# Patient Record
Sex: MALE | Race: WHITE | NOT HISPANIC OR LATINO | Employment: OTHER | URBAN - METROPOLITAN AREA
[De-identification: names, ages, dates, MRNs, and addresses within clinical notes are randomized per-mention and may not be internally consistent; named-entity substitution may affect disease eponyms.]

---

## 2018-04-16 RX ORDER — HYOSCYAMINE SULFATE 0.12 MG/1
TABLET SUBLINGUAL
COMMUNITY
Start: 2011-06-30

## 2018-04-16 RX ORDER — HYDROCHLOROTHIAZIDE 25 MG/1
25 TABLET ORAL DAILY
COMMUNITY
Start: 2011-06-30

## 2018-04-16 RX ORDER — RABEPRAZOLE SODIUM 20 MG/1
20 TABLET, DELAYED RELEASE ORAL DAILY
COMMUNITY
Start: 2011-06-30

## 2018-04-16 RX ORDER — GLUCOSAM/CHONDRO/HERB 149/HYAL 750-100 MG
TABLET ORAL
COMMUNITY
Start: 2011-06-30 | End: 2021-07-29

## 2018-04-16 RX ORDER — LORATADINE 10 MG/1
TABLET ORAL
COMMUNITY
Start: 2011-06-30 | End: 2024-01-04 | Stop reason: ALTCHOICE

## 2018-04-16 RX ORDER — SUMATRIPTAN SUCCINATE 50 MG/1
TABLET ORAL
COMMUNITY
Start: 2017-10-27 | End: 2023-10-09

## 2018-04-16 RX ORDER — METRONIDAZOLE 7.5 MG/G
CREAM TOPICAL
COMMUNITY
Start: 2011-06-30 | End: 2021-04-20

## 2018-04-16 RX ORDER — LOSARTAN POTASSIUM 100 MG/1
100 TABLET ORAL DAILY
COMMUNITY

## 2018-04-16 RX ORDER — BUTALBITAL, ACETAMINOPHEN AND CAFFEINE 50; 325; 40 MG/1; MG/1; MG/1
TABLET ORAL
COMMUNITY
Start: 2011-06-30 | End: 2021-07-29

## 2018-04-16 RX ORDER — BETAMETHASONE DIPROPIONATE 0.5 MG/G
LOTION TOPICAL
COMMUNITY
Start: 2011-06-30 | End: 2021-04-20 | Stop reason: SDUPTHER

## 2018-04-16 RX ORDER — LANOLIN ALCOHOL/MO/W.PET/CERES
CREAM (GRAM) TOPICAL
COMMUNITY
Start: 2011-06-30

## 2018-04-16 RX ORDER — BETAMETHASONE DIPROPIONATE 0.5 MG/G
OINTMENT TOPICAL
COMMUNITY
Start: 2011-06-30 | End: 2021-04-20 | Stop reason: SDUPTHER

## 2018-04-16 RX ORDER — TAMSULOSIN HYDROCHLORIDE 0.4 MG/1
CAPSULE ORAL
COMMUNITY
Start: 2011-06-30

## 2018-04-16 RX ORDER — PRAVASTATIN SODIUM 40 MG/1
TABLET ORAL
COMMUNITY
Start: 2011-06-30

## 2018-04-16 RX ORDER — VERAPAMIL HYDROCHLORIDE 240 MG/1
CAPSULE, DELAYED RELEASE ORAL
COMMUNITY
Start: 2011-06-30 | End: 2021-08-06 | Stop reason: SDUPTHER

## 2018-04-16 RX ORDER — CLOBETASOL PROPIONATE 0.5 MG/G
AEROSOL, FOAM TOPICAL
COMMUNITY
Start: 2011-06-30

## 2018-04-16 RX ORDER — BUPROPION HYDROCHLORIDE 100 MG/1
TABLET, EXTENDED RELEASE ORAL
COMMUNITY
Start: 2011-06-30 | End: 2023-10-09

## 2018-04-16 RX ORDER — ACETAMINOPHEN AND CODEINE PHOSPHATE 300; 30 MG/1; MG/1
TABLET ORAL
COMMUNITY
Start: 2011-06-30

## 2018-04-16 RX ORDER — CLORAZEPATE DIPOTASSIUM 3.75 MG/1
TABLET ORAL
COMMUNITY
Start: 2011-06-30 | End: 2021-04-20

## 2018-04-16 RX ORDER — SUMATRIPTAN SUCCINATE 6 MG/.5ML
INJECTION, SOLUTION SUBCUTANEOUS
COMMUNITY
Start: 2018-01-19 | End: 2021-04-20

## 2018-04-16 RX ORDER — PROMETHAZINE HYDROCHLORIDE 25 MG/1
TABLET ORAL
COMMUNITY
Start: 2017-10-27 | End: 2019-06-06 | Stop reason: SDUPTHER

## 2018-04-16 RX ORDER — FLUOCINONIDE GEL 0.5 MG/G
GEL TOPICAL
COMMUNITY
Start: 2011-06-30 | End: 2021-04-20

## 2018-04-16 RX ORDER — BENZOCAINE .13; .15; .5; 2 G/100G; G/100G; G/100G; G/100G
GEL ORAL
COMMUNITY
Start: 2011-06-30

## 2018-04-16 RX ORDER — AZELASTINE 1 MG/ML
SPRAY, METERED NASAL
COMMUNITY
Start: 2011-06-30

## 2018-04-16 RX ORDER — CALCIPOTRIENE 50 UG/G
CREAM TOPICAL
COMMUNITY
Start: 2011-06-30 | End: 2021-04-20

## 2018-04-16 RX ORDER — CITALOPRAM 40 MG/1
TABLET, FILM COATED ORAL
COMMUNITY
Start: 2011-06-30 | End: 2023-10-09

## 2018-04-20 ENCOUNTER — PROCEDURE VISIT (OUTPATIENT)
Dept: NEUROLOGY | Facility: CLINIC | Age: 58
End: 2018-04-20
Attending: PSYCHIATRY & NEUROLOGY
Payer: COMMERCIAL

## 2018-04-20 DIAGNOSIS — G43.719 INTRACTABLE CHRONIC MIGRAINE WITHOUT AURA AND WITHOUT STATUS MIGRAINOSUS: Primary | ICD-10-CM

## 2018-04-20 PROCEDURE — 64615 CHEMODENERV MUSC MIGRAINE: CPT | Performed by: PSYCHIATRY & NEUROLOGY

## 2018-04-20 NOTE — PROCEDURES
Procedures  This Olu A Collins was injected with Botox using the migraine paradigm.  The patient tolerated the injections without difficulty.    The patient received 20 u of Botox in Right and Left frontalis, two sites each.  The patient received 40 units of Botox in the right and left temporalis in 4 sites each.  Patient  received 30 units of Botox in the right and left occipitalis in  3 sites each.  Patient  received 20 units of Botox in the right and left paraspinal in 2 sites each.  The patient received 30 units of Botox in the right and left trapezius in 3 sites each.  The total amount of Botox received was 155 units.    4/20/2018    Mauricio Love MD

## 2018-08-03 ENCOUNTER — TELEPHONE (OUTPATIENT)
Dept: NEUROLOGY | Facility: CLINIC | Age: 58
End: 2018-08-03

## 2018-08-03 ENCOUNTER — PROCEDURE VISIT (OUTPATIENT)
Dept: NEUROLOGY | Facility: CLINIC | Age: 58
End: 2018-08-03
Payer: COMMERCIAL

## 2018-08-03 DIAGNOSIS — G43.719 INTRACTABLE CHRONIC MIGRAINE WITHOUT AURA AND WITHOUT STATUS MIGRAINOSUS: Primary | ICD-10-CM

## 2018-08-03 PROCEDURE — 99999 PR OFFICE/OUTPT VISIT,PROCEDURE ONLY: CPT | Performed by: PSYCHIATRY & NEUROLOGY

## 2018-08-03 PROCEDURE — 64615 CHEMODENERV MUSC MIGRAINE: CPT | Performed by: PSYCHIATRY & NEUROLOGY

## 2018-08-03 RX ORDER — CALCIUM CARBONATE 260MG(650)
TABLET,CHEWABLE ORAL
COMMUNITY
End: 2023-05-24 | Stop reason: ALTCHOICE

## 2018-08-03 RX ORDER — POTASSIUM CHLORIDE 20 MEQ/1
20 TABLET, EXTENDED RELEASE ORAL DAILY
COMMUNITY

## 2018-08-03 NOTE — PROCEDURES
Procedures  This Olu A Collins was injected with Botox using the migraine paradigm.  The patient tolerated the injections without difficulty.    The patient received 20 u of Botox in Right and Left frontalis, two sites each.  The patient received 40 units of Botox in the right and left temporalis in 4 sites each.  Patient  received 30 units of Botox in the right and left occipitalis in  3 sites each.  Patient  received 20 units of Botox in the right and left paraspinal in 2 sites each.  The patient received 30 units of Botox in the right and left trapezius in 3 sites each.  The total amount of Botox received was 155 units.    8/3/2018    Mauricio Love MD

## 2018-10-26 ENCOUNTER — PROCEDURE VISIT (OUTPATIENT)
Dept: NEUROLOGY | Facility: CLINIC | Age: 58
End: 2018-10-26
Payer: COMMERCIAL

## 2018-10-26 DIAGNOSIS — G43.719 CHRONIC MIGRAINE WITHOUT AURA, INTRACTABLE, WITHOUT STATUS MIGRAINOSUS: Primary | ICD-10-CM

## 2018-10-26 PROCEDURE — 99999 PR OFFICE/OUTPT VISIT,PROCEDURE ONLY: CPT | Performed by: PSYCHIATRY & NEUROLOGY

## 2018-10-26 PROCEDURE — 64615 CHEMODENERV MUSC MIGRAINE: CPT | Performed by: PSYCHIATRY & NEUROLOGY

## 2019-01-18 ENCOUNTER — PROCEDURE VISIT (OUTPATIENT)
Dept: NEUROLOGY | Facility: CLINIC | Age: 59
End: 2019-01-18
Payer: COMMERCIAL

## 2019-01-18 DIAGNOSIS — G43.719 INTRACTABLE CHRONIC MIGRAINE WITHOUT AURA AND WITHOUT STATUS MIGRAINOSUS: ICD-10-CM

## 2019-01-18 DIAGNOSIS — G43.719 CHRONIC MIGRAINE WITHOUT AURA, INTRACTABLE, WITHOUT STATUS MIGRAINOSUS: Primary | ICD-10-CM

## 2019-01-18 PROCEDURE — 64615 CHEMODENERV MUSC MIGRAINE: CPT | Performed by: PSYCHIATRY & NEUROLOGY

## 2019-01-18 PROCEDURE — 99999 PR OFFICE/OUTPT VISIT,PROCEDURE ONLY: CPT | Performed by: PSYCHIATRY & NEUROLOGY

## 2019-01-18 NOTE — PROCEDURES
Procedures   This Olu A Collins was injected with Botox using the migraine paradigm.  The patient tolerated the injections without difficulty.    .The patient received 20 u of Botox in Right and Left frontalis, two sites each.  The patient received 40 units of Botox in the right and left temporalis in 4 sites each.  Patient  received 30 units of Botox in the right and left occipitalis in  3 sites each.  Patient  received 20 units of Botox in the right and left paraspinal in 2 sites each.  The patient received 30 units of Botox in the right and left trapezius in 3 sites each.  The total amount of Botox received was 140 units.    1/18/2019    Mauricio Love MD

## 2019-04-12 ENCOUNTER — PROCEDURE VISIT (OUTPATIENT)
Dept: NEUROLOGY | Facility: CLINIC | Age: 59
End: 2019-04-12
Payer: COMMERCIAL

## 2019-04-12 DIAGNOSIS — G43.719 INTRACTABLE CHRONIC MIGRAINE WITHOUT AURA AND WITHOUT STATUS MIGRAINOSUS: Primary | ICD-10-CM

## 2019-04-12 PROCEDURE — 64615 CHEMODENERV MUSC MIGRAINE: CPT | Performed by: PSYCHIATRY & NEUROLOGY

## 2019-04-12 PROCEDURE — 99999 PR OFFICE/OUTPT VISIT,PROCEDURE ONLY: CPT | Performed by: PSYCHIATRY & NEUROLOGY

## 2019-04-12 NOTE — PROCEDURES
Procedures  This Olu A Collins was injected with Botox using the migraine paradigm.  The patient tolerated the injections without difficulty.    The patient received 20 u of Botox in Right and Left frontalis, two sites each.  The patient received 40 units of Botox in the right and left temporalis in 4 sites each.  Patient  received 30 units of Botox in the right and left occipitalis in  3 sites each.  Patient  received 20 units of Botox in the right and left paraspinal in 2 sites each.  The patient received 30 units of Botox in the right and left trapezius in 3 sites each.  The total amount of Botox received was 1490 units.    4/12/2019    Mauricio Love MD  
No

## 2019-06-06 ENCOUNTER — OFFICE VISIT (OUTPATIENT)
Dept: NEUROLOGY | Facility: CLINIC | Age: 59
End: 2019-06-06
Payer: COMMERCIAL

## 2019-06-06 VITALS
SYSTOLIC BLOOD PRESSURE: 114 MMHG | HEART RATE: 65 BPM | RESPIRATION RATE: 16 BRPM | OXYGEN SATURATION: 95 % | DIASTOLIC BLOOD PRESSURE: 74 MMHG

## 2019-06-06 DIAGNOSIS — G43.719 CHRONIC MIGRAINE WITHOUT AURA, INTRACTABLE, WITHOUT STATUS MIGRAINOSUS: ICD-10-CM

## 2019-06-06 DIAGNOSIS — G43.719 INTRACTABLE CHRONIC MIGRAINE WITHOUT AURA AND WITHOUT STATUS MIGRAINOSUS: Primary | ICD-10-CM

## 2019-06-06 PROCEDURE — 99214 OFFICE O/P EST MOD 30 MIN: CPT | Performed by: PSYCHIATRY & NEUROLOGY

## 2019-06-06 RX ORDER — CYCLOBENZAPRINE HCL 10 MG
1 TABLET ORAL 3 TIMES DAILY PRN
Refills: 0 | COMMUNITY
Start: 2019-04-30 | End: 2023-04-17

## 2019-06-06 RX ORDER — PROMETHAZINE HYDROCHLORIDE 25 MG/1
TABLET ORAL
Qty: 12 TABLET | Refills: 0 | Status: SHIPPED | OUTPATIENT
Start: 2019-06-06 | End: 2023-05-24 | Stop reason: ALTCHOICE

## 2019-06-06 RX ORDER — ESCITALOPRAM OXALATE 10 MG/1
10 TABLET ORAL DAILY
COMMUNITY
Start: 2019-06-04

## 2019-06-06 NOTE — PROGRESS NOTES
6/6/2019    Subjective      Patient ID: Olu Collins is a 59 y.o. male.    HPI     **The patient was back in the office today.  He has had some substantial benefit with the Botox.  His headaches are less intense but no less frequent.  He gets about 2-3 headaches per week.  He is interested in supplementing his Botox with another preventive.  Today we talked at length about the various CGRP antagonist.  He does have gotten issues and I have concerns about him getting severe constipation from the Aimovig.  He would also like a self injector.  I think it is most reasonable to start with the Emgality as I can give him samples and teach him how to use it here.  I have advised him of the side effects.  I have instructed him on expectations and the need to inject it for at least 3 times unless he is having an allergic or other untoward reaction.  He is comfortable with this.*    Current Outpatient Prescriptions   Medication Sig Dispense Refill   • acetaminophen-codeine (TYLENOL-CODEINE #3) 300-30 mg per tablet take 1 tablet by oral route  every 6 hours as needed     • amoxicillin (AMOXIL) 500 mg capsule Take 500 mg by mouth 3 (three) times a day.  0   • azelastine (ASTELIN) 137 mcg (0.1 %) nasal spray spray 2 spray by intranasal route 2 times every day in each nostril     • betamethasone dipropionate (DIPROLENE) 0.05 % ointment apply by topical route  every day a thin layer to the affected area(s)     • betamethasone dipropionate (DIPROSONE) 0.05 % lotion apply by topical route  every day a few drops to the affected area(s) and massage lightly until it disappears     • budesonide (RHINOCORT AQUA) 32 mcg/actuation nasal spray spray 1 spray by intranasal route  every day in each nostril     • buPROPion SR (WELLBUTRIN SR) 100 mg 12 hr tablet take 1 tablet (100MG)  by oral route  every day     • butalbital-acetaminophen-caff (FIORICET, ESGIC) -40 mg per tablet take 1 tablet by oral route  every 4 hours as needed not  to exceed 6 tablets per 24hrs     • calcipotriene (DOVONEX) 0.005 % cream apply by topical route 2 times every day a sufficient amount to cover the lesions in the affected area(s) ; rub in gently and completely     • citalopram (CELEXA) 40 mg tablet take 1 tablet (40MG)  by oral route  every day     • clobetasol (OLUX) 0.05 % topical foam apply by topical route 2 times every day to the affected area(s) in the morning and evening     • clorazepate (TRANXENE T-TAB) 3.75 mg tablet take 1 tablet (3.75MG)  by oral route prn     • cyanocobalamin (vitamin B-12) 1,000 mcg tablet Daily     • cyclobenzaprine (FLEXERIL) 10 mg tablet Take 1 tablet by mouth 3 (three) times a day as needed for muscle spasms.  0   • escitalopram (LEXAPRO) 10 mg tablet      • fluocinonide (LIDEX) 0.05 % gel apply by topical route 2 times every day to the affected area(s)     • hydrochlorothiazide (HYDRODIURIL) 25 mg tablet 25 mg. take 1 tablet (50MG)  by oral route  every day      • HYDROcodone-acetaminophen (NORCO) 5-325 mg per tablet TAKE 1 TABLET BY MOUTH EVERY 4-6 HRS AS NEEDED FOR PAIN  0   • hyoscyamine (LEVSIN) 0.125 mg SL tablet prn     • ibuprofen (MOTRIN) 600 mg tablet TAKE 1 TABLET BY MOUTH EVERY 6 HRS AS NEEDED  0   • lodoxamide (ALOMIDE) 0.1 % ophthalmic solution instill 1 drop by ophthalmic route 4 times every day into affected eye(s)     • loratadine (CLARITIN) 10 mg tablet take 1 tablet (10MG)  prn     • losartan (COZAAR) 50 mg tablet 50 mg. take 1 tablet by oral route  every day      • magnesium citrate 100 mg tablet Take by mouth.     • metroNIDAZOLE (METROCREAM) 0.75 % cream apply by topical route 2 times every day a thin layer to the affected area(s) in the morning and evening     • omega 3-dha-epa-fish oil (FISH OIL) 1,000 mg (120 mg-180 mg) capsule take 1 Tablet by Oral route  every day     • potassium chloride (KLOR-CON) 20 mEq CR tablet Take 20 mEq by mouth 2 (two) times a day.     • pravastatin (PRAVACHOL) 40 mg tablet take  1 tablet (40MG)  by oral route  every day     • promethazine (PHENERGAN) 25 mg tablet TAKE ONE BY MOUTH PRN EVERY 4-6 HOURS. UP TO TWO PER DAY NO MORE THAN TWO DAYS PER WEEK 12 tablet 0   • RABEprazole (ACIPHEX) 20 mg EC tablet take 40 mg  by oral route  every day     • ranitidine (ZANTAC) 150 mg capsule take 1 capsule (150MG)  by oral route prn     • SUMAtriptan (IMITREX) 50 mg tablet prn     • SUMAtriptan succinate (IMITREX STATDOSE KIT REFILL) 6 mg/0.5 mL kit inject (6MG)  by subcutaneous route once; may be repeated in 2hrs prn, up to 2/d , 2d/wk     • tamsulosin (FLOMAX) 0.4 mg capsule take 1 capsule (0.4MG)  by oral route  BID     • verapamil (VERELAN) 240 mg 24 hr capsule take 1 capsule (240MG)  by oral route  every day     • galcanezumab-gnlm (EMGALITY PEN) 120 mg/mL pen injector subcutaneous pen Inject 1 mL (120 mg total) under the skin once for 1 dose. One injection q30 days 1 Syringe 3     No current facility-administered medications for this visit.        The following have been reviewed and updated as appropriate in this visit:       Review of Systems  Neg  Objective     Physical Exam    On examination the patient is well-developed and well-nourished.The patient is alert and oriented to time place and person, the patient has intact recent and remote memory, with appropriate attention span and concentration.  The patient is appropriate with language,can name objects, can repeat phrases, and has has intact spontaneous speech.  The patient is aware of current events, past history, and he has an intact vocabulary.  There is normal station and gait.  There is normal strength and muscle tone of all major muscle groups of the upper and lower extremities.  There is no fasciculations or atrophy.  Cardiac evaluation is normal, with regular rate and rhythm.  There are no murmurs or gallops.      Assessment/Plan   Problem List Items Addressed This Visit     Migraines - Primary    Relevant Medications     galcanezumab-gnlm (EMGALITY PEN) 120 mg/mL pen injector subcutaneous pen    Chronic migraine without aura, intractable, without status migrainosus    Relevant Medications    galcanezumab-gnlm (EMGALITY PEN) 120 mg/mL pen injector subcutaneous pen          **I will give him a sample of the Emgality and order a prescription through the pharmacy.  We will also give him a co-pay card he knows that he needs to inject 2 doses today as a starting dose with the Emgality and then 1 injection/month for a total of 3 separate injections to be given at 30-day intervals.*

## 2019-07-05 ENCOUNTER — PROCEDURE VISIT (OUTPATIENT)
Dept: NEUROLOGY | Facility: CLINIC | Age: 59
End: 2019-07-05
Payer: COMMERCIAL

## 2019-07-05 DIAGNOSIS — G43.719 CHRONIC MIGRAINE WITHOUT AURA, INTRACTABLE, WITHOUT STATUS MIGRAINOSUS: Primary | ICD-10-CM

## 2019-07-05 PROCEDURE — 64615 CHEMODENERV MUSC MIGRAINE: CPT | Performed by: PSYCHIATRY & NEUROLOGY

## 2019-07-05 PROCEDURE — 99999 PR OFFICE/OUTPT VISIT,PROCEDURE ONLY: CPT | Performed by: PSYCHIATRY & NEUROLOGY

## 2019-07-05 NOTE — PROCEDURES
Procedures   This Olu A Collins was injected with Botox using the migraine paradigm.  The patient tolerated the injections without difficulty.    The patient received 20 u of Botox in Right and Left frontalis, two sites each.  The patient received 40 units of Botox in the right and left temporalis in 4 sites each.  Patient  received 30 units of Botox in the right and left occipitalis in  3 sites each.  Patient  received 20 units of Botox in the right and left paraspinal in 2 sites each.  The patient received 30 units of Botox in the right and left trapezius in 3 sites each.  The total amount of Botox received was 140 units.    7/5/2019    Mauricio Love MD

## 2019-08-08 ENCOUNTER — OFFICE VISIT (OUTPATIENT)
Dept: NEUROLOGY | Facility: CLINIC | Age: 59
End: 2019-08-08
Payer: COMMERCIAL

## 2019-08-08 VITALS
HEART RATE: 88 BPM | SYSTOLIC BLOOD PRESSURE: 140 MMHG | OXYGEN SATURATION: 98 % | DIASTOLIC BLOOD PRESSURE: 82 MMHG | RESPIRATION RATE: 14 BRPM

## 2019-08-08 DIAGNOSIS — G43.719 CHRONIC MIGRAINE WITHOUT AURA, INTRACTABLE, WITHOUT STATUS MIGRAINOSUS: Primary | ICD-10-CM

## 2019-08-08 DIAGNOSIS — G43.719 INTRACTABLE CHRONIC MIGRAINE WITHOUT AURA AND WITHOUT STATUS MIGRAINOSUS: ICD-10-CM

## 2019-08-08 PROCEDURE — 99213 OFFICE O/P EST LOW 20 MIN: CPT | Performed by: PSYCHIATRY & NEUROLOGY

## 2019-08-08 NOTE — PROGRESS NOTES
8/8/2019    Subjective      Patient ID: Olu Collins is a 59 y.o. male.    HPI     The patient is back in the office.  I last saw him for his injection on about 1 month ago.  He is back because I had started him on Emgality.  He just had his fourth injection a few days ago.  He has noticed substantial improvement in the frequency of his headaches.  He has gone from about 14 headache days per month to approximately 6.  He does have an injection site reaction in the form of itching.  At one point he thought his SI joint may have been more inflamed.  He is not certain about this.  He is leaving for [place] and will be away for 6 weeks.  We will arrange for him to get the Emgality during this time.  He also asked me about CBD oil.  I told him that there was no good objective evidence 1 where the other.  It is sold freely in the stores and if he wanted to try it at my had no problem with that.    Current Outpatient Prescriptions   Medication Sig Dispense Refill   • acetaminophen-codeine (TYLENOL-CODEINE #3) 300-30 mg per tablet take 1 tablet by oral route  every 6 hours as needed     • azelastine (ASTELIN) 137 mcg (0.1 %) nasal spray spray 2 spray by intranasal route 2 times every day in each nostril     • betamethasone dipropionate (DIPROLENE) 0.05 % ointment apply by topical route  every day a thin layer to the affected area(s)     • betamethasone dipropionate (DIPROSONE) 0.05 % lotion apply by topical route  every day a few drops to the affected area(s) and massage lightly until it disappears     • budesonide (RHINOCORT AQUA) 32 mcg/actuation nasal spray spray 1 spray by intranasal route  every day in each nostril     • calcipotriene (DOVONEX) 0.005 % cream apply by topical route 2 times every day a sufficient amount to cover the lesions in the affected area(s) ; rub in gently and completely     • clobetasol (OLUX) 0.05 % topical foam apply by topical route 2 times every day to the affected area(s) in the morning and  evening     • clorazepate (TRANXENE T-TAB) 3.75 mg tablet take 1 tablet (3.75MG)  by oral route prn     • cyanocobalamin (vitamin B-12) 1,000 mcg tablet Daily     • cyclobenzaprine (FLEXERIL) 10 mg tablet Take 1 tablet by mouth 3 (three) times a day as needed for muscle spasms.  0   • escitalopram (LEXAPRO) 10 mg tablet      • fluocinonide (LIDEX) 0.05 % gel apply by topical route 2 times every day to the affected area(s)     • galcanezumab-gnlm (EMGALITY PEN) 120 mg/mL pen injector subcutaneous pen Inject 1 mL (120 mg total) under the skin once for 1 dose. One injection q30 days 1 Syringe 3   • hydrochlorothiazide (HYDRODIURIL) 25 mg tablet 25 mg. take 1 tablet (50MG)  by oral route  every day      • hyoscyamine (LEVSIN) 0.125 mg SL tablet prn     • ibuprofen (MOTRIN) 600 mg tablet TAKE 1 TABLET BY MOUTH EVERY 6 HRS AS NEEDED  0   • lodoxamide (ALOMIDE) 0.1 % ophthalmic solution instill 1 drop by ophthalmic route 4 times every day into affected eye(s)     • loratadine (CLARITIN) 10 mg tablet take 1 tablet (10MG)  prn     • losartan (COZAAR) 100 mg tablet Take 100 mg by mouth daily. take 1 tablet by oral route  every day      • magnesium citrate 100 mg tablet Take by mouth.     • omega 3-dha-epa-fish oil (FISH OIL) 1,000 mg (120 mg-180 mg) capsule take 1 Tablet by Oral route  every day     • potassium chloride (KLOR-CON) 20 mEq CR tablet Take 20 mEq by mouth 2 (two) times a day.     • pravastatin (PRAVACHOL) 40 mg tablet take 1 tablet (40MG)  by oral route  every day     • promethazine (PHENERGAN) 25 mg tablet TAKE ONE BY MOUTH PRN EVERY 4-6 HOURS. UP TO TWO PER DAY NO MORE THAN TWO DAYS PER WEEK 12 tablet 0   • RABEprazole (ACIPHEX) 20 mg EC tablet take 40 mg  by oral route  every day     • ranitidine (ZANTAC) 150 mg capsule take 1 capsule (150MG)  by oral route prn     • SUMAtriptan (IMITREX) 50 mg tablet prn     • SUMAtriptan succinate (IMITREX STATDOSE KIT REFILL) 6 mg/0.5 mL kit inject (6MG)  by subcutaneous  route once; may be repeated in 2hrs prn, up to 2/d , 2d/wk     • tamsulosin (FLOMAX) 0.4 mg capsule take 1 capsule (0.4MG)  by oral route  BID     • verapamil (VERELAN) 240 mg 24 hr capsule take 1 capsule (240MG)  by oral route  every day     • amoxicillin (AMOXIL) 500 mg capsule Take 500 mg by mouth 3 (three) times a day.  0   • buPROPion SR (WELLBUTRIN SR) 100 mg 12 hr tablet take 1 tablet (100MG)  by oral route  every day     • butalbital-acetaminophen-caff (FIORICET, ESGIC) -40 mg per tablet take 1 tablet by oral route  every 4 hours as needed not to exceed 6 tablets per 24hrs     • citalopram (CELEXA) 40 mg tablet take 1 tablet (40MG)  by oral route  every day     • HYDROcodone-acetaminophen (NORCO) 5-325 mg per tablet TAKE 1 TABLET BY MOUTH EVERY 4-6 HRS AS NEEDED FOR PAIN  0   • metroNIDAZOLE (METROCREAM) 0.75 % cream apply by topical route 2 times every day a thin layer to the affected area(s) in the morning and evening       No current facility-administered medications for this visit.        The following have been reviewed and updated as appropriate in this visit:  Problems       Review of Systems  Neg  Objective     Physical Exam    On examination the patient is well-developed and well-nourished.The patient is alert and oriented to time place and person, the patient has intact recent and remote memory, with appropriate attention span and concentration.  The patient is appropriate with language,can name objects, can repeat phrases, and has has intact spontaneous speech.  The patient is aware of current events, past history, and he has an intact vocabulary.  There is normal station and gait.  There is normal strength and muscle tone of all major muscle groups of the upper and lower extremities.  There is no fasciculations or atrophy.  Cardiac evaluation is normal, with regular rate and rhythm.  There are no murmurs or gallops.      Assessment/Plan   Problem List Items Addressed This Visit     Chronic  migraine without aura, intractable, without status migrainosus - Primary      Other Visit Diagnoses     Intractable chronic migraine without aura and without status migrainosus              For now we will continue with the Emgality and his Botox injections as planned.  He is to call me if there is any problems or questions in the meantime.

## 2019-09-25 ENCOUNTER — TELEPHONE (OUTPATIENT)
Dept: PRIMARY CARE | Facility: CLINIC | Age: 59
End: 2019-09-25

## 2019-09-27 NOTE — TELEPHONE ENCOUNTER
I called the patient to discuss whether he in fact wanted to stop the Botox.  I told him to call the office if he wanted to discuss it with me.  I did tell him that if he does stop the Botox he would need to see me periodically in the office to follow-up on his headaches secure as Emgality etc.  Told him to call me in the office if he has any questions.

## 2019-10-02 NOTE — TELEPHONE ENCOUNTER
Patient called back he is fine with coming in to be seen more often should he keep his appointment on Friday for a follow up or can he cancel and schedule for another day.    [phone]

## 2020-01-06 ENCOUNTER — TELEPHONE (OUTPATIENT)
Dept: PRIMARY CARE | Facility: CLINIC | Age: 60
End: 2020-01-06

## 2020-02-19 ENCOUNTER — OFFICE VISIT (OUTPATIENT)
Dept: NEUROLOGY | Facility: CLINIC | Age: 60
End: 2020-02-19
Payer: COMMERCIAL

## 2020-02-19 VITALS
DIASTOLIC BLOOD PRESSURE: 70 MMHG | HEART RATE: 68 BPM | SYSTOLIC BLOOD PRESSURE: 112 MMHG | RESPIRATION RATE: 14 BRPM | OXYGEN SATURATION: 99 %

## 2020-02-19 DIAGNOSIS — G43.719 CHRONIC MIGRAINE WITHOUT AURA, INTRACTABLE, WITHOUT STATUS MIGRAINOSUS: Primary | ICD-10-CM

## 2020-02-19 DIAGNOSIS — G43.719 INTRACTABLE CHRONIC MIGRAINE WITHOUT AURA AND WITHOUT STATUS MIGRAINOSUS: ICD-10-CM

## 2020-02-19 PROBLEM — N30.10 CHRONIC INTERSTITIAL CYSTITIS: Status: ACTIVE | Noted: 2019-12-02

## 2020-02-19 PROCEDURE — 99212 OFFICE O/P EST SF 10 MIN: CPT | Performed by: PSYCHIATRY & NEUROLOGY

## 2020-02-19 RX ORDER — FINASTERIDE 5 MG/1
5 TABLET, FILM COATED ORAL DAILY
COMMUNITY

## 2020-02-19 RX ORDER — TIZANIDINE 2 MG/1
2 TABLET ORAL EVERY 6 HOURS PRN
COMMUNITY
End: 2021-07-29

## 2020-02-19 NOTE — PROGRESS NOTES
2/19/2020    Subjective      Patient ID: Olu Collins is a 59 y.o. male.    HPI     **.  He gets about 2 inches of redness and itching around that site.  It is not spread he is not getting itching elsewhere.  He feels it when he gets his CGRP his bladder pain becomes worse.  He has talked to his urologist who thinks that this may be the case.  I am is not incontinent or losing his urine.  He is getting about 1 headache per month.  He is getting some headaches related to neck positioning.  He does do his exercises fairly regularly.  Overall he is doing quite well.    Current Outpatient Medications   Medication Sig Dispense Refill   • acetaminophen-codeine (TYLENOL-CODEINE #3) 300-30 mg per tablet take 1 tablet by oral route  every 6 hours as needed     • azelastine (ASTELIN) 137 mcg (0.1 %) nasal spray spray 2 spray by intranasal route 2 times every day in each nostril     • betamethasone dipropionate (DIPROLENE) 0.05 % ointment apply by topical route  every day a thin layer to the affected area(s)     • betamethasone dipropionate (DIPROSONE) 0.05 % lotion apply by topical route  every day a few drops to the affected area(s) and massage lightly until it disappears     • budesonide (RHINOCORT AQUA) 32 mcg/actuation nasal spray spray 1 spray by intranasal route  every day in each nostril     • buPROPion SR (WELLBUTRIN SR) 100 mg 12 hr tablet take 1 tablet (100MG)  by oral route  every day     • butalbital-acetaminophen-caff (FIORICET, ESGIC) -40 mg per tablet take 1 tablet by oral route  every 4 hours as needed not to exceed 6 tablets per 24hrs     • calcipotriene (DOVONEX) 0.005 % cream apply by topical route 2 times every day a sufficient amount to cover the lesions in the affected area(s) ; rub in gently and completely     • citalopram (CELEXA) 40 mg tablet take 1 tablet (40MG)  by oral route  every day     • clobetasol (OLUX) 0.05 % topical foam apply by topical route 2 times every day to the affected  area(s) in the morning and evening     • clorazepate (TRANXENE T-TAB) 3.75 mg tablet take 1 tablet (3.75MG)  by oral route prn     • cyanocobalamin (vitamin B-12) 1,000 mcg tablet Daily     • cyclobenzaprine (FLEXERIL) 10 mg tablet Take 1 tablet by mouth 3 (three) times a day as needed for muscle spasms.  0   • escitalopram (LEXAPRO) 10 mg tablet      • finasteride (PROSCAR) 5 mg tablet Take 5 mg by mouth daily.     • fluocinonide (LIDEX) 0.05 % gel apply by topical route 2 times every day to the affected area(s)     • galcanezumab-gnlm (EMGALITY SYRINGE) 120 mg/mL syringe Inject 120 mg under the skin every 28 (twentyeight) days.     • hydrochlorothiazide (HYDRODIURIL) 25 mg tablet 25 mg. take 1 tablet (50MG)  by oral route  every day      • HYDROcodone-acetaminophen (NORCO) 5-325 mg per tablet TAKE 1 TABLET BY MOUTH EVERY 4-6 HRS AS NEEDED FOR PAIN  0   • hyoscyamine (LEVSIN) 0.125 mg SL tablet prn     • ibuprofen (MOTRIN) 600 mg tablet TAKE 1 TABLET BY MOUTH EVERY 6 HRS AS NEEDED  0   • lodoxamide (ALOMIDE) 0.1 % ophthalmic solution instill 1 drop by ophthalmic route 4 times every day into affected eye(s)     • loratadine (CLARITIN) 10 mg tablet take 1 tablet (10MG)  prn     • losartan (COZAAR) 100 mg tablet Take 100 mg by mouth daily. take 1 tablet by oral route  every day      • magnesium citrate 100 mg tablet Take by mouth.     • metroNIDAZOLE (METROCREAM) 0.75 % cream apply by topical route 2 times every day a thin layer to the affected area(s) in the morning and evening     • omega 3-dha-epa-fish oil (FISH OIL) 1,000 mg (120 mg-180 mg) capsule take 1 Tablet by Oral route  every day     • potassium chloride (KLOR-CON) 20 mEq CR tablet Take 20 mEq by mouth 2 (two) times a day.     • pravastatin (PRAVACHOL) 40 mg tablet take 1 tablet (40MG)  by oral route  every day     • promethazine (PHENERGAN) 25 mg tablet TAKE ONE BY MOUTH PRN EVERY 4-6 HOURS. UP TO TWO PER DAY NO MORE THAN TWO DAYS PER WEEK 12 tablet 0   •  RABEprazole (ACIPHEX) 20 mg EC tablet take 40 mg  by oral route  every day     • ranitidine (ZANTAC) 150 mg capsule take 1 capsule (150MG)  by oral route prn     • SUMAtriptan (IMITREX) 50 mg tablet prn     • SUMAtriptan succinate (IMITREX STATDOSE KIT REFILL) 6 mg/0.5 mL kit inject (6MG)  by subcutaneous route once; may be repeated in 2hrs prn, up to 2/d , 2d/wk     • tamsulosin (FLOMAX) 0.4 mg capsule take 1 capsule (0.4MG)  by oral route  BID     • tiZANidine (ZANAFLEX) 2 mg tablet Take 2 mg by mouth every 6 (six) hours as needed for muscle spasms.     • verapamil (VERELAN) 240 mg 24 hr capsule take 1 capsule (240MG)  by oral route  every day     • amoxicillin (AMOXIL) 500 mg capsule Take 500 mg by mouth 3 (three) times a day.  0     No current facility-administered medications for this visit.        The following have been reviewed and updated as appropriate in this visit:  Problems       Review of Systems  Neg  Objective     Physical Exam    On examination the patient is well-developed and well-nourished.The patient is alert and oriented to time place and person, the patient has intact recent and remote memory, with appropriate attention span and concentration.  The patient is appropriate with language,can name objects, can repeat phrases, and has has intact spontaneous speech.  The patient is aware of current events, past history, and he has an intact vocabulary.  There is normal station and gait.  There is normal strength and muscle tone of all major muscle groups of the upper and lower extremities.  There is no fasciculations or atrophy.  Cardiac evaluation is normal, with regular rate and rhythm.  There are no murmurs or gallops.      Assessment/Plan   Problem List Items Addressed This Visit        Nervous    Migraines    Relevant Medications    tiZANidine (ZANAFLEX) 2 mg tablet    galcanezumab-gnlm (EMGALITY SYRINGE) 120 mg/mL syringe    Chronic migraine without aura, intractable, without status  migrainosus - Primary    Relevant Medications    tiZANidine (ZANAFLEX) 2 mg tablet    galcanezumab-gnlm (EMGALITY SYRINGE) 120 mg/mL syringe          I will continue him on his present medication.  I will see him back in the office in May or so.

## 2020-04-06 NOTE — TELEPHONE ENCOUNTER
Medicine Refill Request    Last Office Visit: 2/19/20  Next Office Visit:5/27/2020        Current Outpatient Medications:   •  acetaminophen-codeine (TYLENOL-CODEINE #3) 300-30 mg per tablet, take 1 tablet by oral route  every 6 hours as needed, Disp: , Rfl:   •  amoxicillin (AMOXIL) 500 mg capsule, Take 500 mg by mouth 3 (three) times a day., Disp: , Rfl: 0  •  azelastine (ASTELIN) 137 mcg (0.1 %) nasal spray, spray 2 spray by intranasal route 2 times every day in each nostril, Disp: , Rfl:   •  betamethasone dipropionate (DIPROLENE) 0.05 % ointment, apply by topical route  every day a thin layer to the affected area(s), Disp: , Rfl:   •  betamethasone dipropionate (DIPROSONE) 0.05 % lotion, apply by topical route  every day a few drops to the affected area(s) and massage lightly until it disappears, Disp: , Rfl:   •  budesonide (RHINOCORT AQUA) 32 mcg/actuation nasal spray, spray 1 spray by intranasal route  every day in each nostril, Disp: , Rfl:   •  buPROPion SR (WELLBUTRIN SR) 100 mg 12 hr tablet, take 1 tablet (100MG)  by oral route  every day, Disp: , Rfl:   •  butalbital-acetaminophen-caff (FIORICET, ESGIC) -40 mg per tablet, take 1 tablet by oral route  every 4 hours as needed not to exceed 6 tablets per 24hrs, Disp: , Rfl:   •  calcipotriene (DOVONEX) 0.005 % cream, apply by topical route 2 times every day a sufficient amount to cover the lesions in the affected area(s) ; rub in gently and completely, Disp: , Rfl:   •  citalopram (CELEXA) 40 mg tablet, take 1 tablet (40MG)  by oral route  every day, Disp: , Rfl:   •  clobetasol (OLUX) 0.05 % topical foam, apply by topical route 2 times every day to the affected area(s) in the morning and evening, Disp: , Rfl:   •  clorazepate (TRANXENE T-TAB) 3.75 mg tablet, take 1 tablet (3.75MG)  by oral route prn, Disp: , Rfl:   •  cyanocobalamin (vitamin B-12) 1,000 mcg tablet, Daily, Disp: , Rfl:   •  cyclobenzaprine (FLEXERIL) 10 mg tablet, Take 1 tablet by  mouth 3 (three) times a day as needed for muscle spasms., Disp: , Rfl: 0  •  escitalopram (LEXAPRO) 10 mg tablet, , Disp: , Rfl:   •  finasteride (PROSCAR) 5 mg tablet, Take 5 mg by mouth daily., Disp: , Rfl:   •  fluocinonide (LIDEX) 0.05 % gel, apply by topical route 2 times every day to the affected area(s), Disp: , Rfl:   •  galcanezumab-gnlm (EMGALITY SYRINGE) 120 mg/mL syringe, Inject 120 mg under the skin every 28 (twentyeight) days., Disp: , Rfl:   •  hydrochlorothiazide (HYDRODIURIL) 25 mg tablet, 25 mg. take 1 tablet (50MG)  by oral route  every day , Disp: , Rfl:   •  HYDROcodone-acetaminophen (NORCO) 5-325 mg per tablet, TAKE 1 TABLET BY MOUTH EVERY 4-6 HRS AS NEEDED FOR PAIN, Disp: , Rfl: 0  •  hyoscyamine (LEVSIN) 0.125 mg SL tablet, prn, Disp: , Rfl:   •  ibuprofen (MOTRIN) 600 mg tablet, TAKE 1 TABLET BY MOUTH EVERY 6 HRS AS NEEDED, Disp: , Rfl: 0  •  lodoxamide (ALOMIDE) 0.1 % ophthalmic solution, instill 1 drop by ophthalmic route 4 times every day into affected eye(s), Disp: , Rfl:   •  loratadine (CLARITIN) 10 mg tablet, take 1 tablet (10MG)  prn, Disp: , Rfl:   •  losartan (COZAAR) 100 mg tablet, Take 100 mg by mouth daily. take 1 tablet by oral route  every day , Disp: , Rfl:   •  magnesium citrate 100 mg tablet, Take by mouth., Disp: , Rfl:   •  metroNIDAZOLE (METROCREAM) 0.75 % cream, apply by topical route 2 times every day a thin layer to the affected area(s) in the morning and evening, Disp: , Rfl:   •  omega 3-dha-epa-fish oil (FISH OIL) 1,000 mg (120 mg-180 mg) capsule, take 1 Tablet by Oral route  every day, Disp: , Rfl:   •  potassium chloride (KLOR-CON) 20 mEq CR tablet, Take 20 mEq by mouth 2 (two) times a day., Disp: , Rfl:   •  pravastatin (PRAVACHOL) 40 mg tablet, take 1 tablet (40MG)  by oral route  every day, Disp: , Rfl:   •  promethazine (PHENERGAN) 25 mg tablet, TAKE ONE BY MOUTH PRN EVERY 4-6 HOURS. UP TO TWO PER DAY NO MORE THAN TWO DAYS PER WEEK, Disp: 12 tablet, Rfl: 0  •   RABEprazole (ACIPHEX) 20 mg EC tablet, take 40 mg  by oral route  every day, Disp: , Rfl:   •  ranitidine (ZANTAC) 150 mg capsule, take 1 capsule (150MG)  by oral route prn, Disp: , Rfl:   •  SUMAtriptan (IMITREX) 50 mg tablet, prn, Disp: , Rfl:   •  SUMAtriptan succinate (IMITREX STATDOSE KIT REFILL) 6 mg/0.5 mL kit, inject (6MG)  by subcutaneous route once; may be repeated in 2hrs prn, up to 2/d , 2d/wk, Disp: , Rfl:   •  tamsulosin (FLOMAX) 0.4 mg capsule, take 1 capsule (0.4MG)  by oral route  BID, Disp: , Rfl:   •  tiZANidine (ZANAFLEX) 2 mg tablet, Take 2 mg by mouth every 6 (six) hours as needed for muscle spasms., Disp: , Rfl:   •  verapamil (VERELAN) 240 mg 24 hr capsule, take 1 capsule (240MG)  by oral route  every day, Disp: , Rfl:       BP Readings from Last 3 Encounters:   02/19/20 112/70   08/08/19 140/82   06/06/19 114/74       Recent Lab results:  No results found for: CHOL, No results found for: HDL, No results found for: LDLCALC, No results found for: TRIG     No results found for: GLUCOSE, No results found for: HGBA1C      No results found for: CREATININE    No results found for: TSH

## 2020-10-21 ENCOUNTER — TELEMEDICINE (OUTPATIENT)
Dept: NEUROLOGY | Facility: CLINIC | Age: 60
End: 2020-10-21
Payer: COMMERCIAL

## 2020-10-21 DIAGNOSIS — G43.719 INTRACTABLE CHRONIC MIGRAINE WITHOUT AURA AND WITHOUT STATUS MIGRAINOSUS: ICD-10-CM

## 2020-10-21 DIAGNOSIS — G43.719 CHRONIC MIGRAINE WITHOUT AURA, INTRACTABLE, WITHOUT STATUS MIGRAINOSUS: Primary | ICD-10-CM

## 2020-10-21 PROBLEM — Z12.11 SCREEN FOR COLON CANCER: Status: ACTIVE | Noted: 2020-10-21

## 2020-10-21 PROBLEM — Z88.9 ALLERGIC CONDITION: Status: ACTIVE | Noted: 2020-10-21

## 2020-10-21 PROBLEM — I10 HYPERTENSION: Status: ACTIVE | Noted: 2020-10-21

## 2020-10-21 PROBLEM — L40.9 PSORIASIS: Status: ACTIVE | Noted: 2020-10-21

## 2020-10-21 PROBLEM — K58.9 IRRITABLE BOWEL SYNDROME: Status: ACTIVE | Noted: 2020-10-21

## 2020-10-21 PROBLEM — K21.9 GASTROESOPHAGEAL REFLUX DISEASE WITH HIATAL HERNIA: Status: ACTIVE | Noted: 2020-10-21

## 2020-10-21 PROBLEM — E78.5 HYPERLIPIDEMIA: Status: ACTIVE | Noted: 2020-10-21

## 2020-10-21 PROBLEM — J30.9 ALLERGIC RHINITIS: Status: ACTIVE | Noted: 2020-10-21

## 2020-10-21 PROBLEM — N40.0 BENIGN PROSTATIC HYPERPLASIA: Status: ACTIVE | Noted: 2020-10-21

## 2020-10-21 PROBLEM — E53.8 COBALAMIN DEFICIENCY: Status: ACTIVE | Noted: 2020-10-21

## 2020-10-21 PROBLEM — K44.9 GASTROESOPHAGEAL REFLUX DISEASE WITH HIATAL HERNIA: Status: ACTIVE | Noted: 2020-10-21

## 2020-10-21 PROBLEM — I45.6 WOLFF-PARKINSON-WHITE PATTERN: Status: ACTIVE | Noted: 2020-10-21

## 2020-10-21 PROBLEM — G89.29 CHRONIC LOW BACK PAIN: Status: ACTIVE | Noted: 2020-10-21

## 2020-10-21 PROBLEM — F51.04 CHRONIC INSOMNIA: Status: ACTIVE | Noted: 2020-10-21

## 2020-10-21 PROBLEM — F32.A DEPRESSION: Status: ACTIVE | Noted: 2020-10-21

## 2020-10-21 PROBLEM — M54.50 CHRONIC LOW BACK PAIN: Status: ACTIVE | Noted: 2020-10-21

## 2020-10-21 PROBLEM — H26.9 CATARACT OF BOTH EYES: Status: ACTIVE | Noted: 2020-10-21

## 2020-10-21 PROCEDURE — 99213 OFFICE O/P EST LOW 20 MIN: CPT | Mod: 95 | Performed by: PSYCHIATRY & NEUROLOGY

## 2020-10-21 RX ORDER — GALCANEZUMAB 120 MG/ML
120 INJECTION, SOLUTION SUBCUTANEOUS
Qty: 1 SYRINGE | Refills: 5 | Status: SHIPPED | OUTPATIENT
Start: 2020-10-21 | End: 2021-04-20 | Stop reason: ALTCHOICE

## 2020-10-21 NOTE — PROGRESS NOTES
Verification of Patient Location:  The patient affirms they are currently located in the following state:[place]     Request for Consent:  You and I are about to have a telemedicine check-in or visit. This is allowed because you are already my patient, and you have requested it.  This telemedicine visit will be billed to your health insurance or you, if you are self-insured.  You understand you will be responsible for any copayments or coinsurances that apply to your telemedicine visit.  Before starting our telemedicine visit, I am required to get your consent for this virtual check-in or visit by telemedicine. Do you consent?      Patient Response to Request for Consent: Yes    The following have been reviewed and updated as appropriate in this visit:         Visit Documentation: I saw the patient in the office today using telemedicine.  Is off Botox and is currently using [place].  See Emgality he may stop working in about the fourth week.  He does not always get headaches but they seem to be more prominent in this fourth week.  His headaches generally occur about 4 times per month and they are less intense and he has fewer auras.  For his headaches he either takes Flexeril and Advil if he thinks it is related to his neck if not he takes Tylenol 3.  He reports he is getting the Tylenol 3 from his primary doctor.  He is using them sparingly.  Asked about nor tach.  I told him that this was a new CGRP drug and that we could try it but it was new and the current medications he is using are effective.  There is no guarantee that the nor tech would work.  We agreed to hold off on making a decision on this for the time being.  We also talked about switching to another CGRP injectable again he will keep track of this using a calendar and we could switch to the other injectables if he wishes.  We have agreed to refill his Emgality.  I will see him back in approximately 6 months.  He is to call me if he has any  further problems or questions in the meantime.          Time Spent in Medical Discussion During This Encounter:18

## 2021-04-13 DIAGNOSIS — Z23 ENCOUNTER FOR IMMUNIZATION: ICD-10-CM

## 2021-04-19 NOTE — PROGRESS NOTES
"      Verification of Patient Location:  The patient affirms they are currently located in the following state:[place]     Request for Consent:  You and I are about to have a telemedicine check-in or visit. This is allowed because you are already my patient, and you have requested it.  This telemedicine visit will be billed to your health insurance or you, if you are self-insured.  You understand you will be responsible for any copayments or coinsurances that apply to your telemedicine visit.  Before starting our telemedicine visit, I am required to get your consent for this virtual check-in or visit by telemedicine. Do you consent?      Patient Response to Request for Consent: Yes    The following have been reviewed and updated as appropriate in this visit:         Visit Documentation: I saw the patient in the office today.  He is currently on Emgality.  Initially he had a rather dramatic response to Emgality going down to no more than 2 headaches per month.  His headache frequency has gone up to about 6/month but they remain much less intense.He does note auras at times which prevent him from driving.  This occurs about 30% of the time he is already on verapamil as part of his hypertensive regimen.Sounds like he is tolerating this well Except for occasional lightheadedness.  We did talk about various options to the Emgality in addition his neck is bothering him.  He has never gotten physical therapy for his neck but only his shoulder.  We will give him A prescription for PT for his neck.  I have told him to learn the exercises and do them religiously daily in addition he is taking Flexeril for \"bladder spasms\" given to him by urologist.  He is taking 10 mg.  I will decrease his Flexeril to 5 mg twice a day I have given her him directions on how to use it.  He does have some reflux and we will avoid nonsteroidals.  Another option besides increasing the verapamil to 360 which I have discussed with him at length " include switching to another CGRP antagonist or adding back Botox.  I suggested he get an EKG and if his he KG is normal and his primary doctor is comfortable I would raise him up to 360 on the verapamil and watch for side effects.  He is somewhat interested in the Ajovy because it can be given every 3 months and he oftentimes goes up to [place] during the summer.With his aura he does not have any diplopia but rather blurred vision.  I have asked him to call me in 2 months to see how he is doing.  If he changes his mind and wants to pursue one of the other options he should be in touch with me in the meantime.  He does need Emgality Flexeril and a PT prescription he is to call me if there are any problems or questions in the meantime        Time Spent: 25  I spent 25 minutes on this date of service performing the following activities: providing counseling and education.

## 2021-04-21 ENCOUNTER — TELEMEDICINE (OUTPATIENT)
Dept: NEUROLOGY | Facility: CLINIC | Age: 61
End: 2021-04-21
Payer: COMMERCIAL

## 2021-04-21 DIAGNOSIS — M54.2 CERVICALGIA: ICD-10-CM

## 2021-04-21 DIAGNOSIS — G43.719 CHRONIC MIGRAINE WITHOUT AURA, INTRACTABLE, WITHOUT STATUS MIGRAINOSUS: Primary | ICD-10-CM

## 2021-04-21 PROCEDURE — 99213 OFFICE O/P EST LOW 20 MIN: CPT | Mod: 95 | Performed by: PSYCHIATRY & NEUROLOGY

## 2021-04-21 RX ORDER — CYCLOBENZAPRINE HCL 5 MG
TABLET ORAL
Qty: 36 TABLET | Refills: 1 | Status: SHIPPED | OUTPATIENT
Start: 2021-04-21 | End: 2021-07-21 | Stop reason: SDUPTHER

## 2021-07-19 NOTE — PROGRESS NOTES
Verification of Patient Location:  The patient affirms they are currently located in the following state:[place]     Audio Only Telemedicine Visit    Request for Consent:  You and I are about to have a telemedicine check-in or visit. This is allowed because you are already my patient, and you have requested it.  This telemedicine visit will be billed to your health insurance or you, if you are self-insured.  You understand you will be responsible for any copayments or coinsurances that apply to your telemedicine visit.  Before starting our telemedicine visit, I am required to get your consent for this virtual check-in or visit by telemedicine. Do you consent?      Patient Response to Request for Consent: Yes    The following have been reviewed and updated as appropriate in this visit:         Visit Documentation: I saw the patient in the office today using telemedicine.  He has had 2 migraines in 3 months.  He did go to see his primary doctor about increasing his verapamil.  He reported to me that he had Sunny-Parkinson-White syndrome as a child but it disappeared.  I do not believe that I was aware of this previously in addition has Did an EKG and she noticed a T wave inversion in one of the leads he is uncertain of theSignificance of this.  He has agreed to go back to his cardiologist Dr. Avendano.He is now taking aspirin 81 mg at the advice of his wife who is a physician.  He is taking it for excessive colon polyps.He was approved by his primary to go to .  He is planning to leave for pain in 2 weeks.  I told him to try to get 1 appointment with the physical therapist so we could at least learn the extractions and exercises before he goes up to [place] so he can do them on a regular basis while up there especially following a long drive.I have given him Flexeril which should help.At this time we will not change his Emgality.  I am trying to get a vacationPrescription so that he will have been off for his time in  [place] for both his Emgality and his Flexeril.  I have will make an appointment for him in 2 weeks to see me.I usually use aspirin as a substitute for the verapamilBut since he is already on it other agents may need to be considered if he needs come off of it I am not certain if Norvasc would be safer or any different.I will see him or talk to him before he goes to [place].  Is to call me with any problems or questions in the meantime.        Time Spent:  25    I spent 20 minutes on this date of service performing the following activities: providing counseling and education.

## 2021-07-21 ENCOUNTER — TELEPHONE (OUTPATIENT)
Dept: SCHEDULING | Facility: CLINIC | Age: 61
End: 2021-07-21

## 2021-07-21 ENCOUNTER — TELEMEDICINE (OUTPATIENT)
Dept: NEUROLOGY | Facility: CLINIC | Age: 61
End: 2021-07-21
Payer: COMMERCIAL

## 2021-07-21 DIAGNOSIS — M54.2 CERVICALGIA: ICD-10-CM

## 2021-07-21 DIAGNOSIS — G43.719 CHRONIC MIGRAINE WITHOUT AURA, INTRACTABLE, WITHOUT STATUS MIGRAINOSUS: Primary | ICD-10-CM

## 2021-07-21 PROBLEM — G89.29 CHRONIC NECK PAIN: Status: ACTIVE | Noted: 2021-07-21

## 2021-07-21 PROBLEM — R00.1 BRADYCARDIA: Status: ACTIVE | Noted: 2021-07-21

## 2021-07-21 PROBLEM — Z79.899 MEDICATION MANAGEMENT: Status: ACTIVE | Noted: 2020-10-21

## 2021-07-21 PROCEDURE — 99214 OFFICE O/P EST MOD 30 MIN: CPT | Mod: 95 | Performed by: PSYCHIATRY & NEUROLOGY

## 2021-07-21 RX ORDER — CYCLOBENZAPRINE HCL 5 MG
TABLET ORAL
Qty: 108 TABLET | Refills: 0 | Status: SHIPPED | OUTPATIENT
Start: 2021-07-21

## 2021-07-21 NOTE — TELEPHONE ENCOUNTER
New Patient Appointment Request    Name of caller: Olu Collins     Diagnosis: abnormal EKG (ordered by PCP)    Referred by: self / previous pt (2011)    Previous Cardiologist name and phone number: Dr. See Sykes (Last OV 07/15/2011.)    Best contact number: #[phone]    Additional notes: Pt is leaving for [place] on 08/06 and will be away for 3 mos. Pt is inquiring if appt could be scheduled w/ Dr. Sykes prior.

## 2021-07-21 NOTE — TELEPHONE ENCOUNTER
Spoke to patient made him aware there were no opening with  by the time he's leaving for vacation. Patient was offered an appointment with another  Here but it was on the day before his trip. Patient will call back.

## 2021-07-28 NOTE — PROGRESS NOTES
Electrophysiology         Reason for visit: abnormal ECG  Referred by :self referred     History of Present Illness:  Patient is a 61-year-old man with past medical history of Sunny-Parkinson-White, psoriasis, migraine, cataract surgery and epidural hematoma who is here with an abnormal EKG.    He was first diagnosed with preexcitation on surface ECG more than 30 years ago.  He underwent EP study.  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 Over time, he lost preexcitation.  He was last seen by Dr. Sykes in 2011 for potential long QT.  He underwent an exercise stress test which showed appropriate QT shortening with exercise. All his prior ECG were reviewed and they all had normal QT intervals.     He recently had an EKG which showed isolated T wave inversion in lead III.  He was referred to our office for further evaluation.      He denies chest pain, shortness of breath, or exertional symptoms.  He is complaining of knee pain and hip pain and otherwise feels well.    A comprehensive 15-point review of systems was performed and was negative unless specifically mentioned in the History of Present Illness.        Past Medical History:   Diagnosis Date   • Migraine        Past Surgical History:   Procedure Laterality Date   • CARDIAC ELECTROPHYSIOLOGY PROCEDURE  1992    EP   • CATARACT EXTRACTION Bilateral 2016   • EVACUATION EPIDURAL HEMATOMA  1975       Allergies:  No known allergies    Current Outpatient Medications on File Prior to Visit   Medication Sig Dispense Refill   • acetaminophen-codeine (TYLENOL-CODEINE #3) 300-30 mg per tablet take 1 tablet by oral route  every 6 hours as needed     • azelastine (ASTELIN) 137 mcg (0.1 %) nasal spray spray 2 spray by intranasal route 2 times every day in each nostril     • budesonide (RHINOCORT AQUA) 32 mcg/actuation nasal spray spray 1 spray by intranasal  route  every day in each nostril     • buPROPion SR (WELLBUTRIN SR) 100 mg 12 hr tablet take 1 tablet (100MG)  by oral route  every day     • citalopram (CELEXA) 40 mg tablet take 1 tablet (40MG)  by oral route  every day     • clobetasol (OLUX) 0.05 % topical foam apply by topical route 2 times every day to the affected area(s) in the morning and evening     • cyanocobalamin (vitamin B-12) 1,000 mcg tablet Daily     • cyclobenzaprine (FLEXERIL) 10 mg tablet Take 1 tablet by mouth 3 (three) times a day as needed for muscle spasms.  0   • cyclobenzaprine (FLEXERIL) 5 mg tablet 1 TAB BY MOUTH UP TO BID  tablet 0   • escitalopram (LEXAPRO) 10 mg tablet      • finasteride (PROSCAR) 5 mg tablet Take 5 mg by mouth daily.     • galcanezumab-gnlm (EMGALITY) 120 mg/mL pen injector subcutaneous pen Inject 1 mL (120 mg total) under the skin every 28 (twentyeight) days. 4 mL 0   • hydrochlorothiazide (HYDRODIURIL) 25 mg tablet 25 mg. take 1 tablet (50MG)  by oral route  every day      • HYDROcodone-acetaminophen (NORCO) 5-325 mg per tablet TAKE 1 TABLET BY MOUTH EVERY 4-6 HRS AS NEEDED FOR PAIN  0   • hyoscyamine (LEVSIN) 0.125 mg SL tablet prn     • ibuprofen (MOTRIN) 600 mg tablet TAKE 1 TABLET BY MOUTH EVERY 6 HRS AS NEEDED  0   • loratadine (CLARITIN) 10 mg tablet take 1 tablet (10MG)  prn     • losartan (COZAAR) 100 mg tablet Take 100 mg by mouth daily. take 1 tablet by oral route  every day      • magnesium citrate 100 mg tablet Take by mouth.     • potassium chloride (KLOR-CON) 20 mEq CR tablet Take 20 mEq by mouth 2 (two) times a day.     • pravastatin (PRAVACHOL) 40 mg tablet take 1 tablet (40MG)  by oral route  every day     • promethazine (PHENERGAN) 25 mg tablet TAKE ONE BY MOUTH PRN EVERY 4-6 HOURS. UP TO TWO PER DAY NO MORE THAN TWO DAYS PER WEEK 12 tablet 0   • RABEprazole (ACIPHEX) 20 mg EC tablet take 40 mg  by oral route  every day     • ranitidine (ZANTAC) 150 mg capsule take 1 capsule (150MG)  by oral  route prn     • SUMAtriptan (IMITREX) 50 mg tablet prn     • tamsulosin (FLOMAX) 0.4 mg capsule take 1 capsule (0.4MG)  by oral route  BID     • verapamil (VERELAN) 240 mg 24 hr capsule take 1 capsule (240MG)  by oral route  every day       No current facility-administered medications on file prior to visit.       Social History     Socioeconomic History   • Marital status: Significant Other     Spouse name: None   • Number of children: None   • Years of education: None   • Highest education level: None   Occupational History   • None   Tobacco Use   • Smoking status: Never Smoker   • Smokeless tobacco: Never Used   Substance and Sexual Activity   • Alcohol use: Yes     Comment: Very, very seldom - 3 drinks p/year   • Drug use: None   • Sexual activity: None   Other Topics Concern   • None   Social History Narrative   • None     Social Determinants of Health     Financial Resource Strain:    • Difficulty of Paying Living Expenses:    Food Insecurity:    • Worried About Running Out of Food in the Last Year:    • Ran Out of Food in the Last Year:    Transportation Needs:    • Lack of Transportation (Medical):    • Lack of Transportation (Non-Medical):    Physical Activity:    • Days of Exercise per Week:    • Minutes of Exercise per Session:    Stress:    • Feeling of Stress :    Social Connections:    • Frequency of Communication with Friends and Family:    • Frequency of Social Gatherings with Friends and Family:    • Attends Shinto Services:    • Active Member of Clubs or Organizations:    • Attends Club or Organization Meetings:    • Marital Status:    Intimate Partner Violence:    • Fear of Current or Ex-Partner:    • Emotionally Abused:    • Physically Abused:    • Sexually Abused:        Family History   Problem Relation Age of Onset   • Dementia Biological Mother    • Lymphoma Biological Father    • No Known Problems Biological Sister    • Melanoma Biological Brother    • Testicular cancer Biological  Brother    • Heart disease Maternal Grandmother    • Heart disease Maternal Grandfather    • Stroke Paternal Grandmother    • Heart disease Paternal Grandmother    • Heart disease Paternal Grandfather    • No Known Problems Biological Sister        Physical Examination:  Vitals:    07/29/21 1631   BP: 138/80   Pulse: 60   Resp: 16     Constitutional: The patient appeared physically well and in no acute distress.  Eyes: Clear conjunctiva. Pupils equal, round, and reactive.  Respiratory: Clear to auscultation, no wheezes or rubs.  Cardiovascular: A comprehensive cardiovascular examination was performed. Highlights include normal jugular venous pressure, 2+ carotids, no bruits. The precordium is quiet. Regular rhythm, rate, S1 and S2 normal, no rubs, or gallops were appreciated. Murmurs: No pathologic murmurs appreciated.  Musculoskeletal/ Extremities: No edema, normal peripheral pulses.  Skin: No rashes or lesions apparent.       No results found for: WBC, HGB, HCT, PLT, CHOL, TRIG, HDL, LDLCALC, ALT, AST, NA, K, CREATININE, BUN, TSH, INR    ECG  normal sinus rhythm, normal axis, isoelectric T wave in lead III, otherwise unremarkable.        Assessment and plan:  Patient is a 61-year-old man with past medical history of Sunny-Parkinson-White, psoriasis, migraine, cataract surgery and epidural hematoma who is here with an abnormal EKG.    I reviewed his EKGs and isolated T wave inversion in lead III has no clinical significance.  He denies exertional chest pain, shortness of breath, syncope or presyncope.  I do not recommend ischemic evaluation based on this isolated finding.    He is complaining of migraine aura and his neurologist is planning to increase his verapamil.  I do not see any contraindication to a higher dose of verapamil.  I asked him to monitor his symptoms given his heart rate may decrease. He will stop taking HCTZ and K supplement if he noticed his SBP is < 120 mmHg.     Recommendations:  - Will obtain  an Echocardiogram.   - follow up in a year.     I spent 45 minutes with the patient for record review, examination, care coordination and counseling.     This letter was generated using speech recognition software. Please excuse any typographical errors.         Rosa Garza MD  Cardiac Electrophysiology  [facility]  7/29/2021

## 2021-07-29 ENCOUNTER — OFFICE VISIT (OUTPATIENT)
Dept: CARDIOLOGY | Facility: CLINIC | Age: 61
End: 2021-07-29
Payer: COMMERCIAL

## 2021-07-29 ENCOUNTER — TELEPHONE (OUTPATIENT)
Dept: CARDIOLOGY | Facility: CLINIC | Age: 61
End: 2021-07-29

## 2021-07-29 VITALS
BODY MASS INDEX: 28.41 KG/M2 | HEART RATE: 60 BPM | SYSTOLIC BLOOD PRESSURE: 138 MMHG | RESPIRATION RATE: 16 BRPM | WEIGHT: 181 LBS | DIASTOLIC BLOOD PRESSURE: 80 MMHG | HEIGHT: 67 IN

## 2021-07-29 DIAGNOSIS — R00.1 BRADYCARDIA: Primary | ICD-10-CM

## 2021-07-29 DIAGNOSIS — I45.6 WOLFF-PARKINSON-WHITE PATTERN: ICD-10-CM

## 2021-07-29 PROCEDURE — 93000 ELECTROCARDIOGRAM COMPLETE: CPT | Performed by: INTERNAL MEDICINE

## 2021-07-29 PROCEDURE — 3008F BODY MASS INDEX DOCD: CPT | Performed by: INTERNAL MEDICINE

## 2021-07-29 PROCEDURE — 3075F SYST BP GE 130 - 139MM HG: CPT | Performed by: INTERNAL MEDICINE

## 2021-07-29 PROCEDURE — 99204 OFFICE O/P NEW MOD 45 MIN: CPT | Performed by: INTERNAL MEDICINE

## 2021-07-29 PROCEDURE — 3079F DIAST BP 80-89 MM HG: CPT | Performed by: INTERNAL MEDICINE

## 2021-07-29 NOTE — LETTER
July 29, 2021     Angeles Renee MD  [address]  [facility] [address]  [facility] [address]    Patient: Olu Collins  YOB: 1960  Date of Visit: 7/29/2021      Dear Dr. Renee:    Thank you for referring Olu Collins to me for evaluation. Below are my notes for this consultation.    If you have questions, please do not hesitate to call me. I look forward to following your patient along with you.         Sincerely,        Rosa Garza MD        CC: MD Greg Briseno Ali R, MD  7/29/2021  5:27 PM  Signed       Electrophysiology         Reason for visit: abnormal ECG  Referred by :self referred     History of Present Illness:  Patient is a 61-year-old man with past medical history of Sunny-Parkinson-White, psoriasis, migraine, cataract surgery and epidural hematoma who is here with an abnormal EKG.    He was first diagnosed with preexcitation on surface ECG more than 30 years ago.  He underwent EP study.  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 Over time, he lost preexcitation.  He was last seen by Dr. Sykes in 2011 for potential long QT.  He underwent an exercise stress test which showed appropriate QT shortening with exercise. All his prior ECG were reviewed and they all had normal QT intervals.     He recently had an EKG which showed isolated T wave inversion in lead III.  He was referred to our office for further evaluation.      He denies chest pain, shortness of breath, or exertional symptoms.  He is complaining of knee pain and hip pain and otherwise feels well.    A comprehensive 15-point review of systems was performed and was negative unless specifically mentioned in the History of Present Illness.        Past Medical History:   Diagnosis Date   • Migraine        Past Surgical History:   Procedure Laterality Date   • CARDIAC  ELECTROPHYSIOLOGY PROCEDURE  1992    EP   • CATARACT EXTRACTION Bilateral 2016   • EVACUATION EPIDURAL HEMATOMA  1975       Allergies:  No known allergies    Current Outpatient Medications on File Prior to Visit   Medication Sig Dispense Refill   • acetaminophen-codeine (TYLENOL-CODEINE #3) 300-30 mg per tablet take 1 tablet by oral route  every 6 hours as needed     • azelastine (ASTELIN) 137 mcg (0.1 %) nasal spray spray 2 spray by intranasal route 2 times every day in each nostril     • budesonide (RHINOCORT AQUA) 32 mcg/actuation nasal spray spray 1 spray by intranasal route  every day in each nostril     • buPROPion SR (WELLBUTRIN SR) 100 mg 12 hr tablet take 1 tablet (100MG)  by oral route  every day     • citalopram (CELEXA) 40 mg tablet take 1 tablet (40MG)  by oral route  every day     • clobetasol (OLUX) 0.05 % topical foam apply by topical route 2 times every day to the affected area(s) in the morning and evening     • cyanocobalamin (vitamin B-12) 1,000 mcg tablet Daily     • cyclobenzaprine (FLEXERIL) 10 mg tablet Take 1 tablet by mouth 3 (three) times a day as needed for muscle spasms.  0   • cyclobenzaprine (FLEXERIL) 5 mg tablet 1 TAB BY MOUTH UP TO BID  tablet 0   • escitalopram (LEXAPRO) 10 mg tablet      • finasteride (PROSCAR) 5 mg tablet Take 5 mg by mouth daily.     • galcanezumab-gnlm (EMGALITY) 120 mg/mL pen injector subcutaneous pen Inject 1 mL (120 mg total) under the skin every 28 (twentyeight) days. 4 mL 0   • hydrochlorothiazide (HYDRODIURIL) 25 mg tablet 25 mg. take 1 tablet (50MG)  by oral route  every day      • HYDROcodone-acetaminophen (NORCO) 5-325 mg per tablet TAKE 1 TABLET BY MOUTH EVERY 4-6 HRS AS NEEDED FOR PAIN  0   • hyoscyamine (LEVSIN) 0.125 mg SL tablet prn     • ibuprofen (MOTRIN) 600 mg tablet TAKE 1 TABLET BY MOUTH EVERY 6 HRS AS NEEDED  0   • loratadine (CLARITIN) 10 mg tablet take 1 tablet (10MG)  prn     • losartan (COZAAR) 100 mg tablet Take 100 mg by mouth  daily. take 1 tablet by oral route  every day      • magnesium citrate 100 mg tablet Take by mouth.     • potassium chloride (KLOR-CON) 20 mEq CR tablet Take 20 mEq by mouth 2 (two) times a day.     • pravastatin (PRAVACHOL) 40 mg tablet take 1 tablet (40MG)  by oral route  every day     • promethazine (PHENERGAN) 25 mg tablet TAKE ONE BY MOUTH PRN EVERY 4-6 HOURS. UP TO TWO PER DAY NO MORE THAN TWO DAYS PER WEEK 12 tablet 0   • RABEprazole (ACIPHEX) 20 mg EC tablet take 40 mg  by oral route  every day     • ranitidine (ZANTAC) 150 mg capsule take 1 capsule (150MG)  by oral route prn     • SUMAtriptan (IMITREX) 50 mg tablet prn     • tamsulosin (FLOMAX) 0.4 mg capsule take 1 capsule (0.4MG)  by oral route  BID     • verapamil (VERELAN) 240 mg 24 hr capsule take 1 capsule (240MG)  by oral route  every day       No current facility-administered medications on file prior to visit.       Social History     Socioeconomic History   • Marital status: Significant Other     Spouse name: None   • Number of children: None   • Years of education: None   • Highest education level: None   Occupational History   • None   Tobacco Use   • Smoking status: Never Smoker   • Smokeless tobacco: Never Used   Substance and Sexual Activity   • Alcohol use: Yes     Comment: Very, very seldom - 3 drinks p/year   • Drug use: None   • Sexual activity: None   Other Topics Concern   • None   Social History Narrative   • None     Social Determinants of Health     Financial Resource Strain:    • Difficulty of Paying Living Expenses:    Food Insecurity:    • Worried About Running Out of Food in the Last Year:    • Ran Out of Food in the Last Year:    Transportation Needs:    • Lack of Transportation (Medical):    • Lack of Transportation (Non-Medical):    Physical Activity:    • Days of Exercise per Week:    • Minutes of Exercise per Session:    Stress:    • Feeling of Stress :    Social Connections:    • Frequency of Communication with Friends and  Family:    • Frequency of Social Gatherings with Friends and Family:    • Attends Sikhism Services:    • Active Member of Clubs or Organizations:    • Attends Club or Organization Meetings:    • Marital Status:    Intimate Partner Violence:    • Fear of Current or Ex-Partner:    • Emotionally Abused:    • Physically Abused:    • Sexually Abused:        Family History   Problem Relation Age of Onset   • Dementia Biological Mother    • Lymphoma Biological Father    • No Known Problems Biological Sister    • Melanoma Biological Brother    • Testicular cancer Biological Brother    • Heart disease Maternal Grandmother    • Heart disease Maternal Grandfather    • Stroke Paternal Grandmother    • Heart disease Paternal Grandmother    • Heart disease Paternal Grandfather    • No Known Problems Biological Sister        Physical Examination:  Vitals:    07/29/21 1631   BP: 138/80   Pulse: 60   Resp: 16     Constitutional: The patient appeared physically well and in no acute distress.  Eyes: Clear conjunctiva. Pupils equal, round, and reactive.  Respiratory: Clear to auscultation, no wheezes or rubs.  Cardiovascular: A comprehensive cardiovascular examination was performed. Highlights include normal jugular venous pressure, 2+ carotids, no bruits. The precordium is quiet. Regular rhythm, rate, S1 and S2 normal, no rubs, or gallops were appreciated. Murmurs: No pathologic murmurs appreciated.  Musculoskeletal/ Extremities: No edema, normal peripheral pulses.  Skin: No rashes or lesions apparent.       No results found for: WBC, HGB, HCT, PLT, CHOL, TRIG, HDL, LDLCALC, ALT, AST, NA, K, CREATININE, BUN, TSH, INR    ECG  normal sinus rhythm, normal axis, isoelectric T wave in lead III, otherwise unremarkable.        Assessment and plan:  Patient is a 61-year-old man with past medical history of Sunny-Parkinson-White, psoriasis, migraine, cataract surgery and epidural hematoma who is here with an abnormal EKG.    I reviewed his  EKGs and isolated T wave inversion in lead III has no clinical significance.  He denies exertional chest pain, shortness of breath, syncope or presyncope.  I do not recommend ischemic evaluation based on this isolated finding.    He is complaining of migraine aura and his neurologist is planning to increase his verapamil.  I do not see any contraindication to a higher dose of verapamil.  I asked him to monitor his symptoms given his heart rate may decrease. He will stop taking HCTZ and K supplement if he noticed his SBP is < 120 mmHg.     Recommendations:  - Will obtain an Echocardiogram.   - follow up in a year.     I spent 45 minutes with the patient for record review, examination, care coordination and counseling.     This letter was generated using speech recognition software. Please excuse any typographical errors.         Rosa Garza MD  Cardiac Electrophysiology  [facility]  7/29/2021

## 2021-08-04 NOTE — PROGRESS NOTES
Verification of Patient Location:  The patient affirms they are currently located in the following state:[place]     Audio Only Telemedicine Visit    Request for Consent:  You and I are about to have a telemedicine check-in or visit. This is allowed because you are already my patient, and you have requested it.  This telemedicine visit will be billed to your health insurance or you, if you are self-insured.  You understand you will be responsible for any copayments or coinsurances that apply to your telemedicine visit.  Before starting our telemedicine visit, I am required to get your consent for this virtual check-in or visit by telemedicine. Do you consent?      Patient Response to Request for Consent: Yes    The following have been reviewed and updated as appropriate in this visit:         Visit Documentation:  I saw the patient in the office today.Using telemedicine.  He did see his primary who sent him to a cardiologist about his EKG.  His cardiologist was not concerned about increasing his verapamil to 360.He is using the Flexeril as needed at bedtime.  I told him to use his exercises daily.Him that he might have some lightheadedness with the increase in the verapamil and some constipation.If he is having any problems with any of the medications. His doctor told him to titrate off slowly the hydrochlorothiazide he has directions for this but I told him to discuss this with his primary doctor.  He will continue his Emgality.All of his medications have been clearedGood we will call him in verapamil 120 SR.  He is to call me if he has any problems or questions in the meantime [facility] has any problems he should call me but we will set up an appointment for October      Time Spent:  21    I spent 25 minutes on this date of service performing the following activities: providing counseling and education.

## 2021-08-06 ENCOUNTER — TELEMEDICINE (OUTPATIENT)
Dept: NEUROLOGY | Facility: CLINIC | Age: 61
End: 2021-08-06
Payer: COMMERCIAL

## 2021-08-06 DIAGNOSIS — G43.001 MIGRAINE WITHOUT AURA AND WITH STATUS MIGRAINOSUS, NOT INTRACTABLE: Primary | ICD-10-CM

## 2021-08-06 PROCEDURE — 99213 OFFICE O/P EST LOW 20 MIN: CPT | Mod: 95 | Performed by: PSYCHIATRY & NEUROLOGY

## 2021-08-06 RX ORDER — AZITHROMYCIN 250 MG/1
TABLET, FILM COATED ORAL
COMMUNITY
Start: 2021-07-27

## 2021-08-06 RX ORDER — VERAPAMIL HYDROCHLORIDE 240 MG/1
240 CAPSULE, DELAYED RELEASE ORAL DAILY
Qty: 30 CAPSULE | Refills: 1 | Status: SHIPPED | OUTPATIENT
Start: 2021-08-06 | End: 2021-12-08 | Stop reason: SDUPTHER

## 2021-08-06 RX ORDER — VERAPAMIL HYDROCHLORIDE 240 MG/1
240 CAPSULE, DELAYED RELEASE ORAL DAILY
Qty: 90 CAPSULE | Refills: 1 | Status: SHIPPED | OUTPATIENT
Start: 2021-08-06 | End: 2021-08-06

## 2021-08-06 RX ORDER — VERAPAMIL HYDROCHLORIDE 120 MG/1
CAPSULE, EXTENDED RELEASE ORAL
Qty: 30 CAPSULE | Refills: 1 | Status: SHIPPED | OUTPATIENT
Start: 2021-08-06 | End: 2021-09-28 | Stop reason: SDUPTHER

## 2021-08-06 RX ORDER — VERAPAMIL HYDROCHLORIDE 360 MG/1
360 CAPSULE, DELAYED RELEASE ORAL DAILY
Qty: 90 CAPSULE | Refills: 0 | Status: SHIPPED | OUTPATIENT
Start: 2021-08-06 | End: 2021-08-06

## 2021-08-06 RX ORDER — OXYCODONE AND ACETAMINOPHEN 5; 325 MG/1; MG/1
TABLET ORAL
COMMUNITY
Start: 2021-07-27 | End: 2023-10-09

## 2021-09-28 DIAGNOSIS — G43.001 MIGRAINE WITHOUT AURA AND WITH STATUS MIGRAINOSUS, NOT INTRACTABLE: ICD-10-CM

## 2021-09-28 RX ORDER — VERAPAMIL HYDROCHLORIDE 120 MG/1
CAPSULE, EXTENDED RELEASE ORAL
Qty: 30 CAPSULE | Refills: 1 | Status: SHIPPED | OUTPATIENT
Start: 2021-09-28 | End: 2021-10-27

## 2021-09-28 NOTE — TELEPHONE ENCOUNTER
Medicine Refill Request    Last Office Visit: 2/19/2020  Last Telemedicine Visit: 8/6/2021 Mauricio Love MD    Next Office Visit: Visit date not found  Next Telemedicine Visit: Visit date not found         Current Outpatient Medications:   •  acetaminophen-codeine (TYLENOL-CODEINE #3) 300-30 mg per tablet, take 1 tablet by oral route  every 6 hours as needed, Disp: , Rfl:   •  azelastine (ASTELIN) 137 mcg (0.1 %) nasal spray, spray 2 spray by intranasal route 2 times every day in each nostril, Disp: , Rfl:   •  azithromycin (ZITHROMAX) 250 mg tablet, TAKE 2 TABLETS BY MOUTH TODAY, THEN TAKE 1 TABLET DAILY FOR 4 DAYS, Disp: , Rfl:   •  budesonide (RHINOCORT AQUA) 32 mcg/actuation nasal spray, spray 1 spray by intranasal route  every day in each nostril, Disp: , Rfl:   •  buPROPion SR (WELLBUTRIN SR) 100 mg 12 hr tablet, take 1 tablet (100MG)  by oral route  every day, Disp: , Rfl:   •  citalopram (CELEXA) 40 mg tablet, take 1 tablet (40MG)  by oral route  every day, Disp: , Rfl:   •  clobetasol (OLUX) 0.05 % topical foam, apply by topical route 2 times every day to the affected area(s) in the morning and evening, Disp: , Rfl:   •  cyanocobalamin (vitamin B-12) 1,000 mcg tablet, Daily, Disp: , Rfl:   •  cyclobenzaprine (FLEXERIL) 10 mg tablet, Take 1 tablet by mouth 3 (three) times a day as needed for muscle spasms., Disp: , Rfl: 0  •  cyclobenzaprine (FLEXERIL) 5 mg tablet, 1 TAB BY MOUTH UP TO BID PRN, Disp: 108 tablet, Rfl: 0  •  escitalopram (LEXAPRO) 10 mg tablet, , Disp: , Rfl:   •  finasteride (PROSCAR) 5 mg tablet, Take 5 mg by mouth daily., Disp: , Rfl:   •  galcanezumab-gnlm (EMGALITY) 120 mg/mL pen injector subcutaneous pen, Inject 1 mL (120 mg total) under the skin every 28 (twentyeight) days., Disp: 4 mL, Rfl: 0  •  hydrochlorothiazide (HYDRODIURIL) 25 mg tablet, 25 mg. take 1 tablet (50MG)  by oral route  every day , Disp: , Rfl:   •  HYDROcodone-acetaminophen (NORCO) 5-325 mg per tablet, TAKE 1  TABLET BY MOUTH EVERY 4-6 HRS AS NEEDED FOR PAIN, Disp: , Rfl: 0  •  hyoscyamine (LEVSIN) 0.125 mg SL tablet, prn, Disp: , Rfl:   •  ibuprofen (MOTRIN) 600 mg tablet, TAKE 1 TABLET BY MOUTH EVERY 6 HRS AS NEEDED, Disp: , Rfl: 0  •  loratadine (CLARITIN) 10 mg tablet, take 1 tablet (10MG)  prn, Disp: , Rfl:   •  losartan (COZAAR) 100 mg tablet, Take 100 mg by mouth daily. take 1 tablet by oral route  every day , Disp: , Rfl:   •  magnesium citrate 100 mg tablet, Take by mouth., Disp: , Rfl:   •  oxyCODONE-acetaminophen (PERCOCET) 5-325 mg per tablet, TAKE 1   2 TABLETS BY ORAL ROUTE EVERY 4 6 HOURS AS NEEDED, Disp: , Rfl:   •  potassium chloride (KLOR-CON) 20 mEq CR tablet, Take 20 mEq by mouth 2 (two) times a day., Disp: , Rfl:   •  pravastatin (PRAVACHOL) 40 mg tablet, take 1 tablet (40MG)  by oral route  every day, Disp: , Rfl:   •  promethazine (PHENERGAN) 25 mg tablet, TAKE ONE BY MOUTH PRN EVERY 4-6 HOURS. UP TO TWO PER DAY NO MORE THAN TWO DAYS PER WEEK, Disp: 12 tablet, Rfl: 0  •  RABEprazole (ACIPHEX) 20 mg EC tablet, take 40 mg  by oral route  every day, Disp: , Rfl:   •  ranitidine (ZANTAC) 150 mg capsule, take 1 capsule (150MG)  by oral route prn, Disp: , Rfl:   •  SUMAtriptan (IMITREX) 50 mg tablet, prn, Disp: , Rfl:   •  tamsulosin (FLOMAX) 0.4 mg capsule, take 1 capsule (0.4MG)  by oral route  BID, Disp: , Rfl:   •  verapamiL (VERELAN) 240 mg 24 hr capsule, Take 1 capsule (240 mg total) by mouth daily., Disp: 30 capsule, Rfl: 1  •  verapamil PM (VERELAN PM) 120 mg 24 hr capsule, Take 1 tab by mouth with 240mg tab daily, Disp: 30 capsule, Rfl: 1      BP Readings from Last 3 Encounters:   07/29/21 138/80   02/19/20 112/70   08/08/19 140/82       Recent Lab results:  No results found for: CHOL, No results found for: HDL, No results found for: LDLCALC, No results found for: TRIG     No results found for: GLUCOSE, No results found for: HGBA1C      No results found for: CREATININE    No results found for:  TSH

## 2021-10-12 ENCOUNTER — HOSPITAL ENCOUNTER (OUTPATIENT)
Dept: CARDIOLOGY | Facility: HOSPITAL | Age: 61
Discharge: HOME | End: 2021-10-12
Attending: INTERNAL MEDICINE
Payer: COMMERCIAL

## 2021-10-12 VITALS
HEIGHT: 67 IN | WEIGHT: 181 LBS | DIASTOLIC BLOOD PRESSURE: 72 MMHG | BODY MASS INDEX: 28.41 KG/M2 | SYSTOLIC BLOOD PRESSURE: 125 MMHG

## 2021-10-12 DIAGNOSIS — R00.1 BRADYCARDIA: ICD-10-CM

## 2021-10-12 LAB
AORTIC ROOT ANNULUS - M-MODE: 3.38 CM
AORTIC VALVE AREA: 1.54 SQUARE CENTIMETERS PER SQUARE METER
ASCENDING AORTA: 3.7 CM
AV PEAK GRADIENT: 7.94 MMHG
AV PEAK VELOCITY-S: 1.41 M/S
BSA FOR ECHO PROCEDURE: 1.97 M2
CUSP SEPARATION: 2.42 CM
DOP CALC RVOT VTI: 15.6 CM
EDV (BP): 99.45 ML
EF (A4C): 74.02 %
EF A2C: 77.24 %
EJECTION FRACTION: 75.19 %
EST RIGHT VENT SYSTOLIC PRESSURE BY TRICUSPID REGURGITATION JET: 27 MMHG
ESV (BP): 24.67 ML
LA ESV (BP): 48.97 ML
LA ESV INDEX (A2C): 28.12 ML/M2
LA ESV INDEX (BP): 24.86 ML/M2
LAAS-AP2: 20.15 CM2
LAAS-AP4: 16.68 CM2
LAD 2D: 3.81 CM
LAV-S: 55.4 ML
LEFT ATRIUM VOLUME INDEX: 20.56 ML/M2
LEFT ATRIUM VOLUME: 40.5 ML
LEFT VENTRICLE DIASTOLIC VOLUME INDEX: 46.7 ML/M2
LEFT VENTRICLE DIASTOLIC VOLUME: 92 ML
LEFT VENTRICLE SYSTOLIC VOLUME INDEX: 12.13 ML/M2
LEFT VENTRICLE SYSTOLIC VOLUME: 23.9 ML
LV DIASTOLIC VOLUME: 107.2 ML
LV ESV (APICAL 2 CHAMBER): 24.4 ML
LVCI: 2.15 LITERS PER MINUTE PER SQUARE METER
LVCO: 4.17 LITERS PER MINUTE
LVEDVI(A2C): 54.42 ML/M2
LVEDVI(BP): 50.48 ML/M2
LVESVI(A2C): 12.39 ML/M2
LVESVI(BP): 12.52 ML/M2
LVOT A: 3.33 CM2
LVOT MG: 3.24 MMHG
LVOT MV: 0.85 M/S
LVOT PEAK VELOCITY: 1.26 M/S
LVOT VTI: 23.48 CM
MV E'TISSUE VEL-LAT: 0.11 M/S
MV E'TISSUE VEL-MED: 0.09 M/S
RVOT VMAX: 0.75 M/S
RVOT VTI: 15.6 CM
SEPTAL TISSUE DOPPLER FREE WALL LATE DIA VELOCITY (APICAL 4 CHAMBER VIEW): 0.75 M/S
TR MAX PG: 24 MMHG
TRICUSPID VALVE PEAK REGURGITATION VELOCITY: 2.4 M/S

## 2021-10-12 PROCEDURE — 93306 TTE W/DOPPLER COMPLETE: CPT | Mod: 26 | Performed by: INTERNAL MEDICINE

## 2021-10-12 PROCEDURE — 93306 TTE W/DOPPLER COMPLETE: CPT

## 2021-10-27 DIAGNOSIS — G43.001 MIGRAINE WITHOUT AURA AND WITH STATUS MIGRAINOSUS, NOT INTRACTABLE: ICD-10-CM

## 2021-10-27 RX ORDER — VERAPAMIL HYDROCHLORIDE 120 MG/1
CAPSULE, EXTENDED RELEASE ORAL
Qty: 30 CAPSULE | Refills: 1 | Status: SHIPPED | OUTPATIENT
Start: 2021-10-27 | End: 2021-11-23

## 2021-10-27 NOTE — TELEPHONE ENCOUNTER
Medicine Refill Request    Last Office Visit: 2/19/2020  Last Telemedicine Visit: 8/6/2021 Mauricio Love MD    Next Office Visit: Visit date not found  Next Telemedicine Visit: Visit date not found         Current Outpatient Medications:   •  acetaminophen-codeine (TYLENOL-CODEINE #3) 300-30 mg per tablet, take 1 tablet by oral route  every 6 hours as needed, Disp: , Rfl:   •  azelastine (ASTELIN) 137 mcg (0.1 %) nasal spray, spray 2 spray by intranasal route 2 times every day in each nostril, Disp: , Rfl:   •  azithromycin (ZITHROMAX) 250 mg tablet, TAKE 2 TABLETS BY MOUTH TODAY, THEN TAKE 1 TABLET DAILY FOR 4 DAYS, Disp: , Rfl:   •  budesonide (RHINOCORT AQUA) 32 mcg/actuation nasal spray, spray 1 spray by intranasal route  every day in each nostril, Disp: , Rfl:   •  buPROPion SR (WELLBUTRIN SR) 100 mg 12 hr tablet, take 1 tablet (100MG)  by oral route  every day, Disp: , Rfl:   •  citalopram (CELEXA) 40 mg tablet, take 1 tablet (40MG)  by oral route  every day, Disp: , Rfl:   •  clobetasol (OLUX) 0.05 % topical foam, apply by topical route 2 times every day to the affected area(s) in the morning and evening, Disp: , Rfl:   •  cyanocobalamin (vitamin B-12) 1,000 mcg tablet, Daily, Disp: , Rfl:   •  cyclobenzaprine (FLEXERIL) 10 mg tablet, Take 1 tablet by mouth 3 (three) times a day as needed for muscle spasms., Disp: , Rfl: 0  •  cyclobenzaprine (FLEXERIL) 5 mg tablet, 1 TAB BY MOUTH UP TO BID PRN, Disp: 108 tablet, Rfl: 0  •  escitalopram (LEXAPRO) 10 mg tablet, , Disp: , Rfl:   •  finasteride (PROSCAR) 5 mg tablet, Take 5 mg by mouth daily., Disp: , Rfl:   •  galcanezumab-gnlm (EMGALITY) 120 mg/mL pen injector subcutaneous pen, Inject 1 mL (120 mg total) under the skin every 28 (twentyeight) days., Disp: 4 mL, Rfl: 0  •  hydrochlorothiazide (HYDRODIURIL) 25 mg tablet, 25 mg. take 1 tablet (50MG)  by oral route  every day , Disp: , Rfl:   •  HYDROcodone-acetaminophen (NORCO) 5-325 mg per tablet, TAKE 1  TABLET BY MOUTH EVERY 4-6 HRS AS NEEDED FOR PAIN, Disp: , Rfl: 0  •  hyoscyamine (LEVSIN) 0.125 mg SL tablet, prn, Disp: , Rfl:   •  ibuprofen (MOTRIN) 600 mg tablet, TAKE 1 TABLET BY MOUTH EVERY 6 HRS AS NEEDED, Disp: , Rfl: 0  •  loratadine (CLARITIN) 10 mg tablet, take 1 tablet (10MG)  prn, Disp: , Rfl:   •  losartan (COZAAR) 100 mg tablet, Take 100 mg by mouth daily. take 1 tablet by oral route  every day , Disp: , Rfl:   •  magnesium citrate 100 mg tablet, Take by mouth., Disp: , Rfl:   •  oxyCODONE-acetaminophen (PERCOCET) 5-325 mg per tablet, TAKE 1   2 TABLETS BY ORAL ROUTE EVERY 4 6 HOURS AS NEEDED, Disp: , Rfl:   •  potassium chloride (KLOR-CON) 20 mEq CR tablet, Take 20 mEq by mouth 2 (two) times a day., Disp: , Rfl:   •  pravastatin (PRAVACHOL) 40 mg tablet, take 1 tablet (40MG)  by oral route  every day, Disp: , Rfl:   •  promethazine (PHENERGAN) 25 mg tablet, TAKE ONE BY MOUTH PRN EVERY 4-6 HOURS. UP TO TWO PER DAY NO MORE THAN TWO DAYS PER WEEK, Disp: 12 tablet, Rfl: 0  •  RABEprazole (ACIPHEX) 20 mg EC tablet, take 40 mg  by oral route  every day, Disp: , Rfl:   •  ranitidine (ZANTAC) 150 mg capsule, take 1 capsule (150MG)  by oral route prn, Disp: , Rfl:   •  SUMAtriptan (IMITREX) 50 mg tablet, prn, Disp: , Rfl:   •  tamsulosin (FLOMAX) 0.4 mg capsule, take 1 capsule (0.4MG)  by oral route  BID, Disp: , Rfl:   •  verapamiL (VERELAN) 240 mg 24 hr capsule, Take 1 capsule (240 mg total) by mouth daily., Disp: 30 capsule, Rfl: 1  •  verapamil PM (VERELAN PM) 120 mg 24 hr capsule, Take 1 tab by mouth with 240mg tab daily, Disp: 30 capsule, Rfl: 1      BP Readings from Last 3 Encounters:   10/12/21 125/72   07/29/21 138/80   02/19/20 112/70       Recent Lab results:  No results found for: CHOL, No results found for: HDL, No results found for: LDLCALC, No results found for: TRIG     No results found for: GLUCOSE, No results found for: HGBA1C      No results found for: CREATININE    No results found for:  TSH

## 2021-11-08 DIAGNOSIS — G43.719 CHRONIC MIGRAINE WITHOUT AURA, INTRACTABLE, WITHOUT STATUS MIGRAINOSUS: ICD-10-CM

## 2021-11-08 RX ORDER — GALCANEZUMAB 120 MG/ML
INJECTION, SOLUTION SUBCUTANEOUS
Qty: 1 ML | Refills: 3 | Status: SHIPPED | OUTPATIENT
Start: 2021-11-08 | End: 2022-03-30 | Stop reason: SDUPTHER

## 2021-11-08 NOTE — TELEPHONE ENCOUNTER
Medicine Refill Request    Last Office Visit: 2/19/2020  Last Telemedicine Visit: 8/6/2021 Mauricio Love MD    Next Office Visit: Visit date not found  Next Telemedicine Visit: Visit date not found         Current Outpatient Medications:   •  acetaminophen-codeine (TYLENOL-CODEINE #3) 300-30 mg per tablet, take 1 tablet by oral route  every 6 hours as needed, Disp: , Rfl:   •  azelastine (ASTELIN) 137 mcg (0.1 %) nasal spray, spray 2 spray by intranasal route 2 times every day in each nostril, Disp: , Rfl:   •  azithromycin (ZITHROMAX) 250 mg tablet, TAKE 2 TABLETS BY MOUTH TODAY, THEN TAKE 1 TABLET DAILY FOR 4 DAYS, Disp: , Rfl:   •  budesonide (RHINOCORT AQUA) 32 mcg/actuation nasal spray, spray 1 spray by intranasal route  every day in each nostril, Disp: , Rfl:   •  buPROPion SR (WELLBUTRIN SR) 100 mg 12 hr tablet, take 1 tablet (100MG)  by oral route  every day, Disp: , Rfl:   •  citalopram (CELEXA) 40 mg tablet, take 1 tablet (40MG)  by oral route  every day, Disp: , Rfl:   •  clobetasol (OLUX) 0.05 % topical foam, apply by topical route 2 times every day to the affected area(s) in the morning and evening, Disp: , Rfl:   •  cyanocobalamin (vitamin B-12) 1,000 mcg tablet, Daily, Disp: , Rfl:   •  cyclobenzaprine (FLEXERIL) 10 mg tablet, Take 1 tablet by mouth 3 (three) times a day as needed for muscle spasms., Disp: , Rfl: 0  •  cyclobenzaprine (FLEXERIL) 5 mg tablet, 1 TAB BY MOUTH UP TO BID PRN, Disp: 108 tablet, Rfl: 0  •  escitalopram (LEXAPRO) 10 mg tablet, , Disp: , Rfl:   •  finasteride (PROSCAR) 5 mg tablet, Take 5 mg by mouth daily., Disp: , Rfl:   •  galcanezumab-gnlm (EMGALITY) 120 mg/mL pen injector subcutaneous pen, Inject 1 mL (120 mg total) under the skin every 28 (twentyeight) days., Disp: 4 mL, Rfl: 0  •  hydrochlorothiazide (HYDRODIURIL) 25 mg tablet, 25 mg. take 1 tablet (50MG)  by oral route  every day , Disp: , Rfl:   •  HYDROcodone-acetaminophen (NORCO) 5-325 mg per tablet, TAKE 1  TABLET BY MOUTH EVERY 4-6 HRS AS NEEDED FOR PAIN, Disp: , Rfl: 0  •  hyoscyamine (LEVSIN) 0.125 mg SL tablet, prn, Disp: , Rfl:   •  ibuprofen (MOTRIN) 600 mg tablet, TAKE 1 TABLET BY MOUTH EVERY 6 HRS AS NEEDED, Disp: , Rfl: 0  •  loratadine (CLARITIN) 10 mg tablet, take 1 tablet (10MG)  prn, Disp: , Rfl:   •  losartan (COZAAR) 100 mg tablet, Take 100 mg by mouth daily. take 1 tablet by oral route  every day , Disp: , Rfl:   •  magnesium citrate 100 mg tablet, Take by mouth., Disp: , Rfl:   •  oxyCODONE-acetaminophen (PERCOCET) 5-325 mg per tablet, , Disp: , Rfl:   •  potassium chloride (KLOR-CON) 20 mEq CR tablet, Take 20 mEq by mouth 2 (two) times a day., Disp: , Rfl:   •  pravastatin (PRAVACHOL) 40 mg tablet, take 1 tablet (40MG)  by oral route  every day, Disp: , Rfl:   •  promethazine (PHENERGAN) 25 mg tablet, TAKE ONE BY MOUTH PRN EVERY 4-6 HOURS. UP TO TWO PER DAY NO MORE THAN TWO DAYS PER WEEK, Disp: 12 tablet, Rfl: 0  •  RABEprazole (ACIPHEX) 20 mg EC tablet, take 40 mg  by oral route  every day, Disp: , Rfl:   •  ranitidine (ZANTAC) 150 mg capsule, take 1 capsule (150MG)  by oral route prn, Disp: , Rfl:   •  SUMAtriptan (IMITREX) 50 mg tablet, prn, Disp: , Rfl:   •  tamsulosin (FLOMAX) 0.4 mg capsule, take 1 capsule (0.4MG)  by oral route  BID, Disp: , Rfl:   •  verapamiL (VERELAN) 240 mg 24 hr capsule, Take 1 capsule (240 mg total) by mouth daily., Disp: 30 capsule, Rfl: 1  •  verapamil  mg 24 hr capsule, TAKE 1 TAB BY MOUTH WITH 240MG TAB DAILY, Disp: 30 capsule, Rfl: 1      BP Readings from Last 3 Encounters:   10/12/21 125/72   07/29/21 138/80   02/19/20 112/70       Recent Lab results:  No results found for: CHOL, No results found for: HDL, No results found for: LDLCALC, No results found for: TRIG     No results found for: GLUCOSE, No results found for: HGBA1C      No results found for: CREATININE    No results found for: TSH

## 2021-11-23 DIAGNOSIS — G43.001 MIGRAINE WITHOUT AURA AND WITH STATUS MIGRAINOSUS, NOT INTRACTABLE: ICD-10-CM

## 2021-11-23 RX ORDER — VERAPAMIL HYDROCHLORIDE 120 MG/1
CAPSULE, EXTENDED RELEASE ORAL
Qty: 30 CAPSULE | Refills: 1 | Status: SHIPPED | OUTPATIENT
Start: 2021-11-23 | End: 2021-12-08 | Stop reason: SDUPTHER

## 2021-11-23 NOTE — TELEPHONE ENCOUNTER
Medicine Refill Request    Last Office Visit: 2/19/2020  Last Telemedicine Visit: 8/6/2021 Mauricio Love MD    Next Office Visit: Visit date not found  Next Telemedicine Visit: Visit date not found         Current Outpatient Medications:   •  acetaminophen-codeine (TYLENOL-CODEINE #3) 300-30 mg per tablet, take 1 tablet by oral route  every 6 hours as needed, Disp: , Rfl:   •  azelastine (ASTELIN) 137 mcg (0.1 %) nasal spray, spray 2 spray by intranasal route 2 times every day in each nostril, Disp: , Rfl:   •  azithromycin (ZITHROMAX) 250 mg tablet, TAKE 2 TABLETS BY MOUTH TODAY, THEN TAKE 1 TABLET DAILY FOR 4 DAYS, Disp: , Rfl:   •  budesonide (RHINOCORT AQUA) 32 mcg/actuation nasal spray, spray 1 spray by intranasal route  every day in each nostril, Disp: , Rfl:   •  buPROPion SR (WELLBUTRIN SR) 100 mg 12 hr tablet, take 1 tablet (100MG)  by oral route  every day, Disp: , Rfl:   •  citalopram (CELEXA) 40 mg tablet, take 1 tablet (40MG)  by oral route  every day, Disp: , Rfl:   •  clobetasol (OLUX) 0.05 % topical foam, apply by topical route 2 times every day to the affected area(s) in the morning and evening, Disp: , Rfl:   •  cyanocobalamin (vitamin B-12) 1,000 mcg tablet, Daily, Disp: , Rfl:   •  cyclobenzaprine (FLEXERIL) 10 mg tablet, Take 1 tablet by mouth 3 (three) times a day as needed for muscle spasms., Disp: , Rfl: 0  •  cyclobenzaprine (FLEXERIL) 5 mg tablet, 1 TAB BY MOUTH UP TO BID PRN, Disp: 108 tablet, Rfl: 0  •  EMGALITY  mg/mL pen injector subcutaneous pen, INJECT 1 ML (120 MG TOTAL) UNDER THE SKIN EVERY 28 (TWENTYEIGHT) DAYS., Disp: 1 mL, Rfl: 3  •  escitalopram (LEXAPRO) 10 mg tablet, , Disp: , Rfl:   •  finasteride (PROSCAR) 5 mg tablet, Take 5 mg by mouth daily., Disp: , Rfl:   •  hydrochlorothiazide (HYDRODIURIL) 25 mg tablet, 25 mg. take 1 tablet (50MG)  by oral route  every day , Disp: , Rfl:   •  HYDROcodone-acetaminophen (NORCO) 5-325 mg per tablet, TAKE 1 TABLET BY MOUTH  EVERY 4-6 HRS AS NEEDED FOR PAIN, Disp: , Rfl: 0  •  hyoscyamine (LEVSIN) 0.125 mg SL tablet, prn, Disp: , Rfl:   •  ibuprofen (MOTRIN) 600 mg tablet, TAKE 1 TABLET BY MOUTH EVERY 6 HRS AS NEEDED, Disp: , Rfl: 0  •  loratadine (CLARITIN) 10 mg tablet, take 1 tablet (10MG)  prn, Disp: , Rfl:   •  losartan (COZAAR) 100 mg tablet, Take 100 mg by mouth daily. take 1 tablet by oral route  every day , Disp: , Rfl:   •  magnesium citrate 100 mg tablet, Take by mouth., Disp: , Rfl:   •  oxyCODONE-acetaminophen (PERCOCET) 5-325 mg per tablet, , Disp: , Rfl:   •  potassium chloride (KLOR-CON) 20 mEq CR tablet, Take 20 mEq by mouth 2 (two) times a day., Disp: , Rfl:   •  pravastatin (PRAVACHOL) 40 mg tablet, take 1 tablet (40MG)  by oral route  every day, Disp: , Rfl:   •  promethazine (PHENERGAN) 25 mg tablet, TAKE ONE BY MOUTH PRN EVERY 4-6 HOURS. UP TO TWO PER DAY NO MORE THAN TWO DAYS PER WEEK, Disp: 12 tablet, Rfl: 0  •  RABEprazole (ACIPHEX) 20 mg EC tablet, take 40 mg  by oral route  every day, Disp: , Rfl:   •  ranitidine (ZANTAC) 150 mg capsule, take 1 capsule (150MG)  by oral route prn, Disp: , Rfl:   •  SUMAtriptan (IMITREX) 50 mg tablet, prn, Disp: , Rfl:   •  tamsulosin (FLOMAX) 0.4 mg capsule, take 1 capsule (0.4MG)  by oral route  BID, Disp: , Rfl:   •  verapamiL (VERELAN) 240 mg 24 hr capsule, Take 1 capsule (240 mg total) by mouth daily., Disp: 30 capsule, Rfl: 1  •  verapamil  mg 24 hr capsule, TAKE 1 TAB BY MOUTH WITH 240MG TAB DAILY, Disp: 30 capsule, Rfl: 1      BP Readings from Last 3 Encounters:   10/12/21 125/72   07/29/21 138/80   02/19/20 112/70       Recent Lab results:  No results found for: CHOL, No results found for: HDL, No results found for: LDLCALC, No results found for: TRIG     No results found for: GLUCOSE, No results found for: HGBA1C      No results found for: CREATININE    No results found for: TSH

## 2021-12-08 DIAGNOSIS — G43.001 MIGRAINE WITHOUT AURA AND WITH STATUS MIGRAINOSUS, NOT INTRACTABLE: ICD-10-CM

## 2021-12-08 RX ORDER — VERAPAMIL HYDROCHLORIDE 120 MG/1
CAPSULE, EXTENDED RELEASE ORAL
Qty: 90 CAPSULE | Refills: 1 | Status: SHIPPED | OUTPATIENT
Start: 2021-12-08 | End: 2023-10-09

## 2021-12-08 RX ORDER — VERAPAMIL HYDROCHLORIDE 240 MG/1
240 CAPSULE, DELAYED RELEASE ORAL DAILY
Qty: 30 CAPSULE | Refills: 1 | Status: SHIPPED | OUTPATIENT
Start: 2021-12-08

## 2022-03-30 DIAGNOSIS — G43.719 CHRONIC MIGRAINE WITHOUT AURA, INTRACTABLE, WITHOUT STATUS MIGRAINOSUS: ICD-10-CM

## 2022-03-31 RX ORDER — GALCANEZUMAB 120 MG/ML
120 INJECTION, SOLUTION SUBCUTANEOUS
Qty: 1 ML | Refills: 3 | Status: SHIPPED | OUTPATIENT
Start: 2022-03-31 | End: 2022-07-18 | Stop reason: ALTCHOICE

## 2022-05-13 ENCOUNTER — TELEMEDICINE (OUTPATIENT)
Dept: NEUROLOGY | Facility: CLINIC | Age: 62
End: 2022-05-13
Payer: COMMERCIAL

## 2022-05-13 DIAGNOSIS — G44.86 CERVICOGENIC HEADACHE: ICD-10-CM

## 2022-05-13 DIAGNOSIS — G43.009 MIGRAINE WITHOUT AURA AND WITHOUT STATUS MIGRAINOSUS, NOT INTRACTABLE: Primary | ICD-10-CM

## 2022-05-13 PROCEDURE — 99213 OFFICE O/P EST LOW 20 MIN: CPT | Mod: 95 | Performed by: PSYCHIATRY & NEUROLOGY

## 2022-05-13 NOTE — PROGRESS NOTES
Verification of Patient Location:  The patient affirms they are currently located in the following state: [place]    Request for Consent:    Audio Only Encounter   You and I are about to have a telemedicine check-in or visit. This is allowed because you have requested it. This telemedicine visit will be billed to your health insurance or you, if you are self-insured. You understand you will be responsible for any copayments or coinsurances that apply to your telemedicine visit. Before starting our telemedicine visit, I am required to get your consent for this virtual check-in or visit by telemedicine. Do you consent?    Patient Response to Request for Consent:  Yes      Visit Documentation:  Subjective     Patient ID: Olu Collins is a 62 y.o. male.  1960      HPII saw the patient in the office today using telemedicine.  He has been using Emgality with success by his standards.  He continues to get sick state low-grade headaches per month which she is satisfied with takes either Tylenol and/or Flexeril for these headaches and they are relieved his headaches start in his neck but then turned into a migraine which is associated with some nausea and some photophobiaHe is doing cervical spine exercises.He has never been on Aimovig or Ajovy.  We did consider Aimovig in the past but he was concerned because of the possible constipation problem.  I did offer to give him Emgality samples.  He does not know what the co-pay with the Emgality will be but he is not anxious to wait to find out what this is.  Together we decided to switch him toAjovy.  We will give him samples a prescription he is to see me in 2 months to see how he is doing.  If he has any problems or questions in the meantime he will let me know.    The following have been reviewed and updated as appropriate in this visit:          Review of Systems      Assessment/Plan     Time Spent:  I spent 15 on this date of service performing the following  activities: obtaining history, documenting, preparing for visit, obtaining / reviewing records, providing counseling and education, communicating results, and coordinating care.

## 2022-06-21 ENCOUNTER — DOCUMENTATION (OUTPATIENT)
Dept: NEUROLOGY | Facility: CLINIC | Age: 62
End: 2022-06-21
Payer: COMMERCIAL

## 2022-07-18 ENCOUNTER — TELEPHONE (OUTPATIENT)
Dept: NEUROLOGY | Facility: CLINIC | Age: 62
End: 2022-07-18
Payer: COMMERCIAL

## 2022-07-18 RX ORDER — FREMANEZUMAB-VFRM 225 MG/1.5ML
225 INJECTION SUBCUTANEOUS
Qty: 4.5 ML | Refills: 3 | Status: SHIPPED | OUTPATIENT
Start: 2022-07-18 | End: 2023-07-05 | Stop reason: ALTCHOICE

## 2023-04-17 ENCOUNTER — TELEMEDICINE (OUTPATIENT)
Dept: NEUROLOGY | Facility: CLINIC | Age: 63
End: 2023-04-17
Payer: MEDICARE

## 2023-04-17 ENCOUNTER — TELEPHONE (OUTPATIENT)
Dept: NEUROLOGY | Facility: CLINIC | Age: 63
End: 2023-04-17

## 2023-04-17 DIAGNOSIS — G43.009 MIGRAINE WITHOUT AURA AND WITHOUT STATUS MIGRAINOSUS, NOT INTRACTABLE: Primary | ICD-10-CM

## 2023-04-17 DIAGNOSIS — G44.86 CERVICOGENIC HEADACHE: ICD-10-CM

## 2023-04-17 PROCEDURE — 99442 PR PHYS/QHP TELEPHONE EVALUATION 11-20 MIN: CPT | Mod: 95 | Performed by: PSYCHIATRY & NEUROLOGY

## 2023-04-17 NOTE — PROGRESS NOTES
"Verification of Patient Location:  The patient affirms they are currently located in the following state: [place]    Request for Consent:    Audio Only Encounter   You and I are about to have a telemedicine check-in or visit. This is allowed because you have requested it. This telemedicine visit will be billed to your health insurance or you, if you are self-insured. You understand you will be responsible for any copayments or coinsurances that apply to your telemedicine visit. Before starting our telemedicine visit, I am required to get your consent for this virtual check-in or visit by telemedicine. Do you consent?    Patient Response to Request for Consent:  Yes      Visit Documentation:  Subjective     Patient ID: Olu Collins is a 63 y.o. male.  1960      HPI  I saw the patient in the office today using telemedicine.  He would like to restart his Botox.  He has been without Botox for several years.  He \"does not like Botox\" and is starting it because he feels that the Botox from 3 years ago has \"worn off\".  He reports that his problem from his perspective starts in his neck and then goes on to precipitate a migraine characterized by nausea and vomiting and photophobia.  He has been getting headaches almost daily for about 4 months.  His headaches are worsened by extending his neck and shoulders.  He has been doing exercises for several years.  He has not had physical therapy for 2 to 3 years and reports that in the past it did not work.  I suggested that he may need a new set of physical therapy exercises.  He is fixed on trying to restart the Botox.  I told him that the Botox may take a while to fully approved and in addition it may not kick in for 3 to 6 months.  In the end I offered him the opinion of one of my colleagues either [place] or Dr. Geovanni Gunderson.  This seems to be an effective solution to his problem.  He has not made an opinion about starting physical therapy.  He will see who is primary " doctor who must write for the physical therapy prescription.  He is comfortable with seeing Dr. Geovanni Gunderson or  Chloe for evaluation.  I do not think I would feel comfortable starting him on Botox at this point and would rather go through physical therapy first.  I would welcome the opinion of another physician.  I have told him to call the office and tell them that he wants to make an appointment with the other physicians.  He is comfortable with this plan.  I will not set up a follow-up appointment with him unless he requires my help while he transitions to another physician or healthcare provider.    The following have been reviewed and updated as appropriate in this visit:        Review of Systems      Assessment/Plan     Time Spent:  I spent 20 on this date of service performing the following activities: obtaining history, documenting, preparing for visit, obtaining / reviewing records, providing counseling and education, communicating results, and coordinating care.

## 2023-04-17 NOTE — TELEPHONE ENCOUNTER
----- Message from Brooklyn Rivera sent at 4/17/2023  1:22 PM EDT -----  Regarding: botox  Here is the chart.

## 2023-04-17 NOTE — TELEPHONE ENCOUNTER
Lucrecia    Can you please call this patient to help him schedule a 60 min JESSICA appt with Janeen?    Patient of Dr. Love. Dr. Love referred to us.    Arleen Melo MD  [facility]  [phone]
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
## 2023-05-23 ENCOUNTER — TELEPHONE (OUTPATIENT)
Dept: NEUROLOGY | Facility: CLINIC | Age: 63
End: 2023-05-23

## 2023-05-23 ENCOUNTER — OFFICE VISIT (OUTPATIENT)
Dept: NEUROLOGY | Facility: CLINIC | Age: 63
End: 2023-05-23
Payer: MEDICARE

## 2023-05-23 VITALS
BODY MASS INDEX: 28.49 KG/M2 | OXYGEN SATURATION: 98 % | HEART RATE: 84 BPM | SYSTOLIC BLOOD PRESSURE: 126 MMHG | DIASTOLIC BLOOD PRESSURE: 82 MMHG | HEIGHT: 68 IN | RESPIRATION RATE: 16 BRPM | WEIGHT: 188 LBS

## 2023-05-23 DIAGNOSIS — G43.719 CHRONIC MIGRAINE WITHOUT AURA, INTRACTABLE, WITHOUT STATUS MIGRAINOSUS: Primary | ICD-10-CM

## 2023-05-23 DIAGNOSIS — G43.711 INTRACTABLE CHRONIC MIGRAINE WITHOUT AURA AND WITH STATUS MIGRAINOSUS: Primary | ICD-10-CM

## 2023-05-23 PROCEDURE — G2212 PROLONG OUTPT/OFFICE VIS: HCPCS | Performed by: NURSE PRACTITIONER

## 2023-05-23 PROCEDURE — 99215 OFFICE O/P EST HI 40 MIN: CPT | Performed by: NURSE PRACTITIONER

## 2023-05-23 PROCEDURE — 200200 PR NO CHARGE: Performed by: NURSE PRACTITIONER

## 2023-05-23 RX ORDER — CETIRIZINE HYDROCHLORIDE 10 MG/1
10 TABLET ORAL DAILY
COMMUNITY

## 2023-05-23 RX ORDER — DICLOFENAC SODIUM 10 MG/G
GEL TOPICAL 3 TIMES DAILY PRN
COMMUNITY

## 2023-05-23 RX ORDER — DICYCLOMINE HYDROCHLORIDE 20 MG/1
10-20 TABLET ORAL AS NEEDED
COMMUNITY

## 2023-05-23 RX ORDER — NARATRIPTAN 2.5 MG/1
2.5 TABLET ORAL ONCE AS NEEDED
Qty: 12 TABLET | Refills: 5 | Status: SHIPPED | OUTPATIENT
Start: 2023-05-23 | End: 2024-10-14 | Stop reason: SDUPTHER

## 2023-05-23 RX ORDER — FAMOTIDINE 40 MG/1
40 TABLET, FILM COATED ORAL AS NEEDED
COMMUNITY

## 2023-05-23 RX ORDER — CALCIPOTRIENE 0.05 MG/ML
SOLUTION TOPICAL AS NEEDED
COMMUNITY

## 2023-05-23 RX ORDER — RISANKIZUMAB-RZAA 150 MG/ML
INJECTION SUBCUTANEOUS
COMMUNITY

## 2023-05-23 NOTE — PATIENT INSTRUCTIONS
Diagnosis:  Chronic migraine     Plan:  No tests are needed at this time.    Keep a headache diary and bring to next clinic visit.    Treatment recommendations:     1. Healthy Habits:  - Try stress reduction exercises   - Maintain a steady sleep schedule over the week and weekend and practice good sleep hygiene   - Stay well hydrated  - Eat regular meals throughout the day  - Limit caffeine to < 3 cups of coffee per day  - Try to get at least 30 minutes of daily aerobic exercise 3-5 days per week    2. Acute (as needed) Treatment:    Trial of naratriptan 2.5 mg. 1 tab at onset of migraine, may repeat once after FOUR hours if needed. Max of 2 per 24 hours.  - Limit to 2-3 days per week to avoid rebound headache    Trial of Nurtec ODT. Take 75 mg as needed at onset of moderate to severe headache. Maximum 1 tablet in 24 hours.   Nurtec ODT copay savings card information   Two ways to activate a card:   - Text NSAVE to 956-19  - Brooks online at [email or web address]    Future consideration: Ubrelvy.     **Acute medications are much more effective when taken at the onset of a headache. Try not to wait until the pain is severe.    3. Preventive Treatment:     Restart Botox.     Continue Ajovy for June. Switch to Aimovig in July.     Future consideration: IV Vyepti.     **    [employer] is our preferred method of communication. If you do not have Internet access, you can call the Headache Program at [phone] if there are problems before your next visit. If you experience a medical emergency and cannot wait for a phone call during office hours, please call 911 or go to the nearest emergency room or urgent care center.     It has been a pleasure seeing you today in clinic and look forward to your next visit.

## 2023-05-31 NOTE — PROGRESS NOTES
Last Botox: initial treatment.   Time interval: every 12 weeks.     Botox treatment administered today.   Procedure modified based on prior Botox experience.   160 Units.  See procedure note.    **    He tried the naratriptan, and it was effective and tolerable.     He has not tried the Nurtec yet, but it is covered by his insurance company. I sent a prescription to his pharmacy.

## 2023-06-01 ENCOUNTER — OFFICE VISIT (OUTPATIENT)
Dept: NEUROLOGY | Facility: CLINIC | Age: 63
End: 2023-06-01
Attending: NURSE PRACTITIONER
Payer: MEDICARE

## 2023-06-01 VITALS — WEIGHT: 188 LBS | HEIGHT: 68 IN | BODY MASS INDEX: 28.49 KG/M2

## 2023-06-01 DIAGNOSIS — G43.719 CHRONIC MIGRAINE WITHOUT AURA, INTRACTABLE, WITHOUT STATUS MIGRAINOSUS: ICD-10-CM

## 2023-06-01 DIAGNOSIS — G43.711 INTRACTABLE CHRONIC MIGRAINE WITHOUT AURA AND WITH STATUS MIGRAINOSUS: Primary | ICD-10-CM

## 2023-06-01 PROCEDURE — 99999 PR OFFICE/OUTPT VISIT,PROCEDURE ONLY: CPT | Performed by: NURSE PRACTITIONER

## 2023-06-01 PROCEDURE — 64615 CHEMODENERV MUSC MIGRAINE: CPT | Performed by: NURSE PRACTITIONER

## 2023-06-01 RX ORDER — RIMEGEPANT SULFATE 75 MG/75MG
75 TABLET, ORALLY DISINTEGRATING ORAL AS NEEDED
Qty: 16 TABLET | Refills: 5 | Status: SHIPPED | OUTPATIENT
Start: 2023-06-01 | End: 2025-01-08 | Stop reason: SDUPTHER

## 2023-06-01 NOTE — PROCEDURES
Procedures    Procedure Note for Botox Injections for Headache:    Indication for procedure:    Diagnosis Plan   1. Intractable chronic migraine without aura and with status migrainosus  onabotulinumtoxinA (BOTOX) 200 unit injection 200 Units    Injection          I explained the risks and benefits and obtained consent from Olu.  Onabotulinumtoxin A 200 U were diluted in 4 mL of saline.    Lot number and expiration date documented in informed consent and MAR.  I performed a time-out, including 2 unique patient identifiers with Olu.  Using a 30 gauge 1/2 inch needle, I injected 0.1mL = 5 U into each site according to the Chronic Migraine Protocol (PREEMPT Studies).   The following table reports the number of sites injected in each muscle.     Muscle Midline Right Left   Procerus NONE          NONE NONE    Frontalis   2 2   Temporalis   4  4    Occipitalis   3  3    Cervical Paraspinal   2 2   Trapezius   3 (+10 U)  3 (+10 U)    Other:            160 Units were injected and 40 Units were wasted.    6/1/2023: Protocol modified based on prior Botox experience.      Olu tolerated the procedure well, without complications.      We appreciate the opportunity to participate in the care of Olu Collins.     Please, do not hesitate to call our office at [phone] if you have any questions or concerns.          ABHIJIT Bolanos  [facility]

## 2023-07-05 ENCOUNTER — TELEPHONE (OUTPATIENT)
Dept: NEUROLOGY | Facility: CLINIC | Age: 63
End: 2023-07-05
Payer: MEDICARE

## 2023-07-05 NOTE — TELEPHONE ENCOUNTER
Medicine Refill Request    Last Office Visit: 6/1/2023  Last Telemedicine Visit: 4/17/2023 Mauricio Love MD    Next Office Visit: 9/19/2023  Next Telemedicine Visit: Visit date not found     Continue Ajovy for June. Switch to Aimovig in July, as it will be the only available CGRP MAB available through his Medicare Part D plan.         Can you please order me Aimovig 140 mg/ml Pen 30 days supply with 4 refills. From the [facility] pharmacy in [facility], the number is [phone]. [address]

## 2023-07-05 NOTE — TELEPHONE ENCOUNTER
[facility] Appointment Request   Provider: Dr Corral   Appointment Type: Botox  Reason for Visit: Needs to reschedule upcoming Botox appt  Available Day and Time: On or after Oct 2nd  Best Contact Number: [phone]     The practice will reach out to schedule your appointment within the next 2 business days.

## 2023-10-09 ENCOUNTER — OFFICE VISIT (OUTPATIENT)
Dept: NEUROLOGY | Facility: CLINIC | Age: 63
End: 2023-10-09
Attending: NURSE PRACTITIONER
Payer: MEDICARE

## 2023-10-09 VITALS
HEART RATE: 53 BPM | OXYGEN SATURATION: 95 % | DIASTOLIC BLOOD PRESSURE: 84 MMHG | RESPIRATION RATE: 16 BRPM | HEIGHT: 68 IN | SYSTOLIC BLOOD PRESSURE: 140 MMHG | BODY MASS INDEX: 28.49 KG/M2 | WEIGHT: 188 LBS

## 2023-10-09 DIAGNOSIS — M54.2 CERVICALGIA: ICD-10-CM

## 2023-10-09 DIAGNOSIS — G44.321 INTRACTABLE CHRONIC POST-TRAUMATIC HEADACHE: ICD-10-CM

## 2023-10-09 DIAGNOSIS — G44.86 CERVICOGENIC HEADACHE: ICD-10-CM

## 2023-10-09 DIAGNOSIS — G43.719 CHRONIC MIGRAINE WITHOUT AURA, INTRACTABLE, WITHOUT STATUS MIGRAINOSUS: Primary | ICD-10-CM

## 2023-10-09 DIAGNOSIS — M79.18 MUSCULOSKELETAL PAIN: ICD-10-CM

## 2023-10-09 PROCEDURE — G8754 DIAS BP LESS 90: HCPCS | Performed by: PSYCHIATRY & NEUROLOGY

## 2023-10-09 PROCEDURE — 99215 OFFICE O/P EST HI 40 MIN: CPT | Mod: 25 | Performed by: PSYCHIATRY & NEUROLOGY

## 2023-10-09 PROCEDURE — 64615 CHEMODENERV MUSC MIGRAINE: CPT | Performed by: PSYCHIATRY & NEUROLOGY

## 2023-10-09 PROCEDURE — G8753 SYS BP > OR = 140: HCPCS | Performed by: PSYCHIATRY & NEUROLOGY
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
## 2023-10-09 NOTE — PATIENT INSTRUCTIONS
Diagnosis:  Chronic migraine   Cervicogenic headaches     Evaluation:  Referral to Dr. Dilladr (PM&R)  Future consideration: sleep medicine consultation.   No additional tests are needed at this time. If there are new symptoms or changes in the characteristics of the headaches, I will re-evaluate Olu and consider if diagnostic tests are needed.     Treatment plan:      1. Healthy Habits: The following recommendations can greatly reduce the number and severity of headaches.  Maintain regular sleep hours and get sufficient sleep (8-9 hours)  Do not skip meals, especially breakfast  Drink at least 64 oz or 8 cups of water daily - enough to urinate 5-6 times a day  Get at least 30 minutes of daily aerobic exercise (enough to increase your heart rate and sweat)    Keep track of headaches and use of acute medication (prescription or over-the-counter) in a calendar or notebook and bring that to your clinic visits.    2. Acute Treatment:     Continue with naratriptan 2.5 mg. 1 tab at onset of migraine, may repeat once after FOUR hours if needed. Max of 2 per 24 hours. Limit to 2-3 days per week to avoid rebound headache    May use Nurtec ODT 75 mg as needed at onset of moderate to severe headache. Maximum 1 tablet in 24 hours.     May continue with Flexeril 5 or 10 mg as needed at bedtime for neck pain and cervicogenic headaches.     Future consideration: Other triptans, Ubrelvy.     Lasmiditan, Sprix nasal spray, Zavzpret nasal spray, or Dihydroergotamine nasal spray can be tried for rescue treatment if needed in the future.     We may add an antinausea medication if needed for nausea or as co-adjuvant if monotherapy is not sufficient.      3. Preventive Treatment (when headaches occur more than 1 day/week or interfere with functioning):     Continue with Botox 160 U every 12 weeks. We will consider weaning off or extending the botox intervals at the next visit.    Continue with Aimovig 70 mg SC monthly.       Future consideration discussed: switch Aimovig to IV Vyepti.     Nutraceutical options include: riboflavin, magnesium, melatonin, feverfew, and coenzyme Q10.     Options for oral preventive medications include: nortriptyline, beta blockers, zonisamide, atogepant, rimegepant, valproate, gabapentin, pregabalin, duloxetine, candesartan, Namenda, venlafaxine, and levetiracetam.     Procedures that can be used to prevent headaches include: nerve blocks and trigger point injections. We recommend to obtain prior authorization from insurance when considering these.    Several non-invasive neuromodulation devices (gammaCore, Cefaly and Nerivio) are available to treat headaches preventively and/or acutely. These are typically not covered by insurance, but the companies have a trial period and will refund the cost of the device if ineffective and returned within the trial period.     Follow-up in the Headache Clinic in 12 weeks for the next Botox treatment.
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
## 2023-11-07 RX ORDER — ERENUMAB-AOOE 70 MG/ML
70 INJECTION SUBCUTANEOUS
Qty: 1 ML | Refills: 3 | Status: SHIPPED | OUTPATIENT
Start: 2023-11-07 | End: 2024-04-02

## 2023-11-07 NOTE — TELEPHONE ENCOUNTER
Medicine Refill Request    Last Office Visit: 10/9/2023  Last Telemedicine Visit: 4/17/2023 Mauricio Love MD    Next Office Visit: 1/4/2024  Next Telemedicine Visit: Visit date not found     Continue with Aimovig 70 mg SC monthly. The dose can be increased to 140 mg monthly if well tolerated.      Future consideration discussed: switch Aimovig to IV Vyepti.

## 2023-11-07 NOTE — TELEPHONE ENCOUNTER
Medicine Refill Request    Last Office Visit: 10/9/2023  *Arleen Corral MD*  Last Telemedicine Visit: 4/17/2023 Mauricio Love MD    Next Office Visit: 1/4/2024  Next Telemedicine Visit: Visit date not found     Continue with Aimovig 70 mg SC monthly. The dose can be increased to 140 mg monthly if well tolerated.

## 2023-11-23 NOTE — PROCEDURES
Procedures    Procedure Note for Botox Injections for Headache:    Indication for procedure:    Diagnosis Plan   1. Chronic migraine without aura, intractable, without status migrainosus  [facility] Botulinum Toxin Injection Appointment Request    onabotulinumtoxinA (BOTOX) 200 unit injection 200 Units    onabotulinumtoxinA (BOTOX) 200 unit injection 200 Units      2. Cervicogenic headache  Ambulatory referral to Physical Medicine Rehab      3. Cervicalgia  Ambulatory referral to Physical Medicine Rehab      4. Musculoskeletal pain  Ambulatory referral to Physical Medicine Rehab      5. Intractable chronic post-traumatic headache            I explained the risks and benefits and obtained consent from Olu.  Onabotulinumtoxin A 200 U were diluted in 4 mL of saline.    Lot number and expiration date documented in informed consent and MAR.  I performed a time-out, including 2 unique patient identifiers with Olu.  Using a 30 gauge 1/2 inch needle, I injected 0.1mL = 5 U into each site according to the Chronic Migraine Protocol (PREEMPT Studies).   The following table reports the number of sites injected in each muscle.        Muscle Midline Right Left   Procerus NONE          NONE NONE    Frontalis   2 2   Temporalis   4  4    Occipitalis   3  3    Cervical Paraspinal   2 2   Trapezius   3 (+10 U)  3 (+10 U)    Other:            160 Units were injected and 40 Units were wasted.    6/1/2023: Protocol modified based on prior Botox experience.     Olu tolerated the procedure well, without complications. He was instructed that he could experience increased pain in the next 2-3 days, which should be treated with ice and anti-inflammatory medications.      We appreciate the opportunity to participate in the care of Olu Collins.     Please, do not hesitate to call me at [phone] if you have any questions or concerns.      Arleen Melo MD  [facility]  [phone]      
23-Nov-2023 04:22
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
## 2024-01-04 ENCOUNTER — OFFICE VISIT (OUTPATIENT)
Dept: NEUROLOGY | Facility: CLINIC | Age: 64
End: 2024-01-04
Attending: NURSE PRACTITIONER
Payer: MEDICARE

## 2024-01-04 VITALS — SYSTOLIC BLOOD PRESSURE: 135 MMHG | OXYGEN SATURATION: 96 % | HEART RATE: 83 BPM | DIASTOLIC BLOOD PRESSURE: 85 MMHG

## 2024-01-04 DIAGNOSIS — G43.719 CHRONIC MIGRAINE WITHOUT AURA, INTRACTABLE, WITHOUT STATUS MIGRAINOSUS: ICD-10-CM

## 2024-01-04 PROCEDURE — 99213 OFFICE O/P EST LOW 20 MIN: CPT | Performed by: NURSE PRACTITIONER

## 2024-01-04 PROCEDURE — G8752 SYS BP LESS 140: HCPCS | Performed by: NURSE PRACTITIONER

## 2024-01-04 PROCEDURE — G8754 DIAS BP LESS 90: HCPCS | Performed by: NURSE PRACTITIONER

## 2024-01-04 NOTE — PROCEDURES
Procedures      Procedure Note for Botox Injections for Headache:    Indication for procedure:    Diagnosis Plan   1. Chronic migraine without aura, intractable, without status migrainosus  [facility] Botulinum Toxin Injection Appointment Request    onabotulinumtoxinA (BOTOX) 200 unit injection 200 Units    Injection          I explained the risks and benefits and obtained consent from Olu.  Onabotulinumtoxin A 200 U were diluted in 4 mL of saline.    Lot number and expiration date documented in informed consent and MAR.  I performed a time-out, including 2 unique patient identifiers with Olu.  Using a 30 gauge 1/2 inch needle, I injected 0.1mL = 5 U into each site according to the Chronic Migraine Protocol (PREEMPT Studies).   The following table reports the number of sites injected in each muscle.        Muscle Midline Right Left   Procerus NONE          NONE NONE    Frontalis   2 2   Temporalis   4  4 (+10 U)    Occipitalis   3  3 (+10 U)    Cervical Paraspinal   2 2   Trapezius   3 (+10 U)  3 (+10 U)    Other:            180 Units were injected and 20 Units were wasted.    6/1/2023: Protocol modified based on prior Botox experience.    1/4/2024: Patient with persistent left side pain. Units added to temporalis and occipitalis on the left.     Olu tolerated the procedure well, without complications.      We appreciate the opportunity to participate in the care of Olu Collins.     Please, do not hesitate to call me at [phone] if you have any questions or concerns.      ABHIJIT Bolanos  [facility]
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
## 2024-01-25 ENCOUNTER — OFFICE VISIT (OUTPATIENT)
Dept: CARDIOLOGY | Facility: CLINIC | Age: 64
End: 2024-01-25
Payer: MEDICARE

## 2024-01-25 VITALS
HEIGHT: 67 IN | BODY MASS INDEX: 29.19 KG/M2 | SYSTOLIC BLOOD PRESSURE: 130 MMHG | WEIGHT: 186 LBS | DIASTOLIC BLOOD PRESSURE: 82 MMHG | HEART RATE: 73 BPM

## 2024-01-25 DIAGNOSIS — R00.1 BRADYCARDIA: Primary | ICD-10-CM

## 2024-01-25 PROCEDURE — G8754 DIAS BP LESS 90: HCPCS | Performed by: INTERNAL MEDICINE

## 2024-01-25 PROCEDURE — 93000 ELECTROCARDIOGRAM COMPLETE: CPT | Performed by: INTERNAL MEDICINE

## 2024-01-25 PROCEDURE — G8752 SYS BP LESS 140: HCPCS | Performed by: INTERNAL MEDICINE

## 2024-01-25 PROCEDURE — 99214 OFFICE O/P EST MOD 30 MIN: CPT | Performed by: INTERNAL MEDICINE
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
## 2024-04-02 ENCOUNTER — OFFICE VISIT (OUTPATIENT)
Dept: NEUROLOGY | Facility: CLINIC | Age: 64
End: 2024-04-02
Attending: NURSE PRACTITIONER
Payer: MEDICARE

## 2024-04-02 VITALS
DIASTOLIC BLOOD PRESSURE: 80 MMHG | OXYGEN SATURATION: 97 % | SYSTOLIC BLOOD PRESSURE: 130 MMHG | HEIGHT: 67 IN | HEART RATE: 67 BPM | WEIGHT: 186 LBS | BODY MASS INDEX: 29.19 KG/M2 | RESPIRATION RATE: 16 BRPM

## 2024-04-02 DIAGNOSIS — M79.18 MUSCULOSKELETAL PAIN: ICD-10-CM

## 2024-04-02 DIAGNOSIS — G44.86 CERVICOGENIC HEADACHE: ICD-10-CM

## 2024-04-02 DIAGNOSIS — G44.321 INTRACTABLE CHRONIC POST-TRAUMATIC HEADACHE: ICD-10-CM

## 2024-04-02 DIAGNOSIS — G43.719 CHRONIC MIGRAINE WITHOUT AURA, INTRACTABLE, WITHOUT STATUS MIGRAINOSUS: Primary | ICD-10-CM

## 2024-04-02 DIAGNOSIS — M54.2 CERVICALGIA: ICD-10-CM

## 2024-04-02 PROCEDURE — 99215 OFFICE O/P EST HI 40 MIN: CPT | Mod: 25 | Performed by: PSYCHIATRY & NEUROLOGY

## 2024-04-02 PROCEDURE — G8752 SYS BP LESS 140: HCPCS | Performed by: PSYCHIATRY & NEUROLOGY

## 2024-04-02 PROCEDURE — 64615 CHEMODENERV MUSC MIGRAINE: CPT | Performed by: PSYCHIATRY & NEUROLOGY

## 2024-04-02 PROCEDURE — G8754 DIAS BP LESS 90: HCPCS | Performed by: PSYCHIATRY & NEUROLOGY

## 2024-04-02 RX ORDER — ZOLPIDEM TARTRATE 10 MG/1
10 TABLET ORAL
COMMUNITY
Start: 2024-03-17

## 2024-04-02 RX ORDER — ROSUVASTATIN CALCIUM 20 MG/1
TABLET, COATED ORAL
COMMUNITY
Start: 2024-03-17

## 2024-04-02 NOTE — PROCEDURES
Procedures  Procedure Note for Botox Injections for Headache:    Indication for procedure:    Diagnosis Plan   1. Chronic migraine without aura, intractable, without status migrainosus  [facility] Botulinum Toxin Injection Appointment Request    onabotulinumtoxinA (BOTOX) 200 unit injection 200 Units      2. Cervicogenic headache        3. Cervicalgia        4. Musculoskeletal pain        5. Intractable chronic post-traumatic headache          I explained the risks and benefits and obtained consent from Olu.  Onabotulinumtoxin A 200 U were diluted in 4 mL of saline.    Lot number and expiration date documented in informed consent and MAR.  I performed a time-out, including 2 unique patient identifiers with Olu.  Using a 30 gauge 1/2 inch needle, I injected 0.1mL = 5 U into each site according to the Chronic Migraine Protocol (PREEMPT Studies).   The following table reports the number of sites injected in each muscle.        Muscle Midline Right Left   Procerus NONE          NONE NONE    Frontalis   2 2   Temporalis   4 (5+5+5+10) 4 (5+5+10+10)    Occipitalis   3 (5+10+10) 3 (5+10+10)    Cervical Paraspinal   2 2   Trapezius   3 (10+10+5)  3 (10+10+5)    Other:            195 Units were injected and 5 Units were wasted.    6/1/2023: Protocol modified based on prior Botox experience.    1/4/2024: Patient with persistent left side pain. Units added to temporalis and occipitalis on the left.  4/2/2024: Increased dose to 195 U by adding extra in the temporalis and occipitalis.      Olu tolerated the procedure well, without complications. He was instructed that he could experience increased pain in the next 2-3 days, which should be treated with ice and anti-inflammatory medications.      We appreciate the opportunity to participate in the care of Olu Collins.     Please, do not hesitate to call me at [phone] if you have any questions or concerns.        Arleen Melo MD  [facility] Headache  Program  [phone]
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
## 2024-04-02 NOTE — PATIENT INSTRUCTIONS
Treatment plan:      1. Healthy Habits: The following recommendations can greatly reduce the number and severity of headaches.  Maintain regular sleep hours and get sufficient sleep (8-9 hours)  Do not skip meals, especially breakfast  Drink at least 64 oz or 8 cups of water daily - enough to urinate 5-6 times a day  Get at least 30 minutes of daily aerobic exercise (enough to increase your heart rate and sweat)    Keep track of headaches and use of acute medication (prescription or over-the-counter) in a calendar or notebook and bring that to your clinic visits.  May use the free robb - [employer] Migraine Tracker    2. Acute Treatment:     Continue with naratriptan 2.5 mg. 1 tab at onset of migraine, may repeat once after FOUR hours if needed. Max of 2 per 24 hours. Limit to 2-3 days per week to avoid rebound headache    May use Nurtec ODT 75 mg as needed at onset of moderate to severe headache. Maximum 1 tablet in 24 hours.     May continue with Flexeril 5 or 10 mg as needed at bedtime for neck pain and cervicogenic headaches.     Future consideration: Other triptans, Ubrelvy.     Lasmiditan, Sprix nasal spray, Zavzpret nasal spray, or Dihydroergotamine nasal spray can be tried for rescue treatment if needed in the future.     We may add an antinausea medication if needed for nausea or as co-adjuvant if monotherapy is not sufficient.      3. Preventive Treatment (when headaches occur more than 1 day/week or interfere with functioning):     Increased Botox today from 180 to 195 U (modified protocol) every 12 weeks.     Recommend increasing the dose of Aimovig to 140 mg SC monthly.      Future consideration previously discussed: switch Aimovig to IV Vyepti.     Nutraceutical options include: riboflavin, magnesium, melatonin, feverfew, and coenzyme Q10.     Options for oral preventive medications include: nortriptyline, beta blockers, zonisamide, atogepant, rimegepant, valproate, gabapentin, pregabalin, duloxetine,  candesartan, Namenda, venlafaxine, and levetiracetam.     Procedures that can be used to prevent headaches include: nerve blocks and trigger point injections. We recommend to obtain prior authorization from insurance when considering these.    Several non-invasive neuromodulation devices (gammaCore, Cefaly and Nerivio) are available to treat headaches preventively and/or acutely. These are typically not covered by insurance, but the companies have a trial period and will refund the cost of the device if ineffective and returned within the trial period.     Follow-up in [facility] in 12 weeks for the next Botox treatment.

## 2024-04-02 NOTE — LETTER
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    Lot number and expiration date documented in informed consent and MAR.  I performed a time-out, including 2 unique patient identifiers with Olu.  Using a 30 gauge 1/2 inch needle, I injected 0.1mL = 5 U into each site according to the Chronic Migraine Protocol (PREEMPT Studies).   The following table reports the number of sites injected in each muscle.        Muscle Midline Right Left   Procerus NONE          NONE NONE    Frontalis   2 2   Temporalis   4 (5+5+5+10) 4 (5+5+10+10)    Occipitalis   3 (5+10+10) 3 (5+10+10)    Cervical Paraspinal   2 2   Trapezius   3 (10+10+5)  3 (10+10+5)    Other:            195 Units were injected and 5 Units were wasted.    6/1/2023: Protocol modified based on prior Botox experience.    1/4/2024: Patient with persistent left side pain. Units added to temporalis and occipitalis on the left.  4/2/2024: Increased dose to 195 U by adding extra in the temporalis and occipitalis.      Olu tolerated the procedure well, without complications. He was instructed that he could experience increased pain in the next 2-3 days, which should be treated with ice and anti-inflammatory  medications.      We appreciate the opportunity to participate in the care of Olu Collins.     Please, do not hesitate to call me at [phone] if you have any questions or concerns.        Arleen Melo MD  [facility]  [phone]

## 2024-04-06 ENCOUNTER — TRANSCRIBE ORDERS (OUTPATIENT)
Dept: SCHEDULING | Age: 64
End: 2024-04-06

## 2024-04-06 DIAGNOSIS — M47.812 SPONDYLOSIS WITHOUT MYELOPATHY OR RADICULOPATHY, CERVICAL REGION: Primary | ICD-10-CM

## 2024-04-24 ENCOUNTER — HOSPITAL ENCOUNTER (OUTPATIENT)
Dept: RADIOLOGY | Facility: HOSPITAL | Age: 64
Discharge: HOME | End: 2024-04-24
Attending: PHYSICAL MEDICINE & REHABILITATION
Payer: MEDICARE

## 2024-04-24 DIAGNOSIS — M47.812 SPONDYLOSIS WITHOUT MYELOPATHY OR RADICULOPATHY, CERVICAL REGION: ICD-10-CM

## 2024-04-24 PROCEDURE — 72141 MRI NECK SPINE W/O DYE: CPT

## 2024-07-02 DIAGNOSIS — G43.719 CHRONIC MIGRAINE WITHOUT AURA, INTRACTABLE, WITHOUT STATUS MIGRAINOSUS: Primary | ICD-10-CM

## 2024-07-02 NOTE — TELEPHONE ENCOUNTER
Medicine Refill Request    Last Office Visit: 4/2/2024  Last Telemedicine Visit: 4/17/2023 Mauricio Love MD    Next Office Visit: 10/14/2024  Next Telemedicine Visit: Visit date not found     PATIENT MESSAGE: I am in [place] for the summer and for some reason the attempt to transfer the Aimovig prescription from [facility] to their mail order has met with issues.   [place] closest [facility] in [facility].       Please have your office call the prescription for Aimovig 140mg with 90 day supply, to [facility] at [phone] or fax [phone].    Recommend increasing the dose of Aimovig to 140 mg SC monthly.

## 2024-07-10 DIAGNOSIS — G43.719 CHRONIC MIGRAINE WITHOUT AURA, INTRACTABLE, WITHOUT STATUS MIGRAINOSUS: ICD-10-CM

## 2024-07-10 NOTE — TELEPHONE ENCOUNTER
Medicine Refill Request    Last Office Visit: 4/2/2024  *Dr. Melo*  Last Telemedicine Visit: 4/17/2023 Mauricio Love MD    Next Office Visit: 10/14/2024  Next Telemedicine Visit: Visit date not found        Future consideration previously discussed: switch Aimovig to IV Vyepti.          On The pharmacy request new prescription is 70mg/ML

## 2024-10-14 ENCOUNTER — OFFICE VISIT (OUTPATIENT)
Dept: NEUROLOGY | Facility: CLINIC | Age: 64
End: 2024-10-14
Attending: NURSE PRACTITIONER
Payer: MEDICARE

## 2024-10-14 VITALS
HEART RATE: 89 BPM | WEIGHT: 186 LBS | OXYGEN SATURATION: 96 % | SYSTOLIC BLOOD PRESSURE: 120 MMHG | DIASTOLIC BLOOD PRESSURE: 60 MMHG | BODY MASS INDEX: 29.19 KG/M2 | HEIGHT: 67 IN | RESPIRATION RATE: 16 BRPM

## 2024-10-14 DIAGNOSIS — G43.719 CHRONIC MIGRAINE WITHOUT AURA, INTRACTABLE, WITHOUT STATUS MIGRAINOSUS: Primary | ICD-10-CM

## 2024-10-14 DIAGNOSIS — M54.2 CERVICALGIA: ICD-10-CM

## 2024-10-14 DIAGNOSIS — G44.86 CERVICOGENIC HEADACHE: ICD-10-CM

## 2024-10-14 PROCEDURE — 64615 CHEMODENERV MUSC MIGRAINE: CPT | Performed by: PSYCHIATRY & NEUROLOGY

## 2024-10-14 PROCEDURE — 99213 OFFICE O/P EST LOW 20 MIN: CPT | Mod: 25 | Performed by: PSYCHIATRY & NEUROLOGY

## 2024-10-14 PROCEDURE — G8754 DIAS BP LESS 90: HCPCS | Performed by: PSYCHIATRY & NEUROLOGY

## 2024-10-14 PROCEDURE — G8752 SYS BP LESS 140: HCPCS | Performed by: PSYCHIATRY & NEUROLOGY

## 2024-10-14 RX ORDER — NARATRIPTAN 2.5 MG/1
2.5 TABLET ORAL ONCE AS NEEDED
Qty: 12 TABLET | Refills: 5 | Status: SHIPPED | OUTPATIENT
Start: 2024-10-14 | End: 2025-01-08 | Stop reason: SDUPTHER

## 2024-10-14 NOTE — PROCEDURES
Procedures  Procedure Note for Botox Injections for Headache:    Indication for procedure:    Diagnosis Plan   1. Chronic migraine without aura, intractable, without status migrainosus  [facility] Botulinum Toxin Injection Appointment Request    onabotulinumtoxinA (BOTOX) 200 unit injection 200 Units          I explained the risks and benefits and obtained consent from Olu.  Onabotulinumtoxin A 200 U were diluted in 4 mL of saline.    Lot number and expiration date documented in informed consent and MAR.  I performed a time-out, including 2 unique patient identifiers with Olu.  Using a 30 gauge 1/2 inch needle, I injected 0.1mL = 5 U into each site according to the Chronic Migraine Protocol (PREEMPT Studies).   The following table reports the number of sites injected in each muscle.     Muscle Midline Right Left   Procerus NONE          NONE NONE    Frontalis  1 behind hairline 2 behind hairline 2 behind hairline   Temporalis   4 (5+5+5+10) 4 (5+5+5+10)    Occipitalis   3 (5+10+10) 3 (5+10+10)    Cervical Paraspinal   2 2   Trapezius   3 (10+10+5)  3 (10+10+5)    Other:            195 Units were injected and 5 Units were wasted.    6/1/2023: Protocol modified based on prior Botox experience.    1/4/2024: Patient with persistent left side pain. Units added to temporalis and occipitalis on the left.  4/2/2024: Increased dose to 195 U by adding extra in the temporalis and occipitalis.   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     Olu tolerated the procedure well, without complications.  He was instructed that he could experience increased pain in the next 2-3 days, which should be treated with ice and anti-inflammatory medications.      We appreciate the opportunity to participate in the care of Olu Collins.     Please, do not hesitate to call me at [phone] if you have  any questions or concerns.        Arleen Melo MD  [facility]  [phone]

## 2024-10-14 NOTE — PROGRESS NOTES
"Olu Collins presents today for botox treatment.    HPI:  Chronic migraine condition has been \"a little less frequent and severe\".     His last botox was in 2024 because he spends his summers in [place].     He got a steroid injection in the C-spine from Dr. Jc in May and found that beneficial.    Headache lo days in April, 11 in May, 3 in , 10 in July, 9 in August, 6 in September and 11 so far in October.    Exam:  Vitals:    10/14/24 1404   BP: 120/60   Pulse: 89   Resp: 16   SpO2: 96%     Alert and oriented. Fluent with normal language and speech. Face symmetric.    Current Outpatient Medications:     acetaminophen-codeine (TYLENOL-CODEINE #3) 300-30 mg per tablet, take 1 tablet by oral route  every 6 hours as needed, Disp: , Rfl:     azelastine (ASTELIN) 137 mcg (0.1 %) nasal spray, spray 2 spray by intranasal route 2 times every day in each nostril, Disp: , Rfl:     budesonide (RHINOCORT AQUA) 32 mcg/actuation nasal spray, spray 1 spray by intranasal route  every day in each nostril, Disp: , Rfl:     calcipotriene 0.005 % ointment, Apply topically as needed., Disp: , Rfl:     cetirizine (ZyrTEC) 10 mg tablet, Take 10 mg by mouth daily., Disp: , Rfl:     clobetasol (OLUX) 0.05 % topical foam, apply by topical route 2 times every day to the affected area(s) in the morning and evening, Disp: , Rfl:     COQ10, UBIQUINOL, ORAL, Take by mouth daily., Disp: , Rfl:     cyanocobalamin (vitamin B-12) 1,000 mcg tablet, Daily, Disp: , Rfl:     cyclobenzaprine (FLEXERIL) 5 mg tablet, 1 TAB BY MOUTH UP TO BID PRN (Patient taking differently: Take 5 mg by mouth 2 (two) times a day as needed for muscle spasms. 1 TAB BY MOUTH UP TO BID PRN), Disp: 108 tablet, Rfl: 0    diclofenac sodium (VOLTAREN) 1 % topical gel, Apply topically 3 (three) times a day as needed for pain., Disp: , Rfl:     dicyclomine (BENTYL) 20 mg tablet, Take 10-20 mg by mouth as needed., Disp: , Rfl:     erenumab-aooe 140 mg/mL " auto-injector, Inject 140 mg under the skin every 30 (thirty) days Indications: migraine prevention., Disp: 3 mL, Rfl: 3    escitalopram (LEXAPRO) 10 mg tablet, Take 10 mg by mouth daily., Disp: , Rfl:     famotidine (PEPCID) 40 mg tablet, Take 40 mg by mouth as needed for heartburn., Disp: , Rfl:     finasteride (PROSCAR) 5 mg tablet, Take 5 mg by mouth daily., Disp: , Rfl:     hydrochlorothiazide (HYDRODIURIL) 25 mg tablet, Take 25 mg by mouth daily., Disp: , Rfl:     HYDROcodone-acetaminophen (NORCO) 5-325 mg per tablet, TAKE 1 TABLET BY MOUTH EVERY 4-6 HRS AS NEEDED FOR PAIN, Disp: , Rfl: 0    hyoscyamine (LEVSIN) 0.125 mg SL tablet, prn, Disp: , Rfl:     ibuprofen (MOTRIN) 600 mg tablet, TAKE 1 TABLET BY MOUTH EVERY 6 HRS AS NEEDED, Disp: , Rfl: 0    losartan (COZAAR) 100 mg tablet, Take 100 mg by mouth daily. take 1 tablet by oral route  every day , Disp: , Rfl:     MAGNESIUM OXIDE ORAL, Take 250 mg by mouth daily., Disp: , Rfl:     naratriptan (AMERGE) 2.5 mg tablet, Take 1 tablet (2.5 mg total) by mouth once as needed for migraine. May repeat in 4 hours if unresolved. Do not exceed 5 mg in 24 hours., Disp: 12 tablet, Rfl: 5    potassium chloride (KLOR-CON) 20 mEq CR tablet, Take 20 mEq by mouth daily., Disp: , Rfl:     RABEprazole (ACIPHEX) 20 mg EC tablet, Take 20 mg by mouth daily., Disp: , Rfl:     ranitidine (ZANTAC) 150 mg capsule, take 1 capsule (150MG)  by oral route prn, Disp: , Rfl:     rimegepant (NURTEC ODT) 75 mg tablet,disintegrating, Take 1 tablet (75 mg total) by mouth as needed (migraine)., Disp: 16 tablet, Rfl: 5    risankizumab-rzaa (SKYRIZI) 150 mg/mL pen injector, Inject under the skin., Disp: , Rfl:     rosuvastatin (CRESTOR) 20 mg tablet, , Disp: , Rfl:     SILODOSIN ORAL, Take by mouth daily with breakfast. DOSAGE UNKNOWN, Disp: , Rfl:     tamsulosin (FLOMAX) 0.4 mg capsule, take 1 capsule (0.4MG)  by oral route  BID, Disp: , Rfl:     verapamiL 240 mg 24 hr capsule, Take 1 capsule (240  mg total) by mouth daily., Disp: 30 capsule, Rfl: 1    zolpidem (AMBIEN) 10 mg tablet, Take 10 mg by mouth., Disp: , Rfl:     azithromycin (ZITHROMAX) 250 mg tablet, TAKE 2 TABLETS BY MOUTH TODAY, THEN TAKE 1 TABLET DAILY FOR 4 DAYS, Disp: , Rfl:     pravastatin (PRAVACHOL) 40 mg tablet, take 1 tablet (40MG)  by oral route  every day, Disp: , Rfl:     TUMERIC-GING-OLIVE-OREG-CAPRYL ORAL, Take 2,000 mg by mouth once., Disp: , Rfl:     Current Facility-Administered Medications:     onabotulinumtoxinA (BOTOX) 200 unit injection 200 Units, 200 Units, intramuscular, Once,     Assessment and plan:  Chronic migraine.  Recommend continuing with the current treatment plan.   Refills for naratriptan sent today.     Follow-up for next botox in 12 weeks for botox or sooner if needed.        Arleen Melo MD  [facility]  [phone]    I spent 20 minutes on this date of service performing the following activities: obtaining history, performing examination, entering orders, documenting, preparing for visit, obtaining / reviewing records, and providing counseling and education.  This was in addition to the time dedicated to the procedure.

## 2024-11-04 ENCOUNTER — TRANSCRIBE ORDERS (OUTPATIENT)
Dept: SCHEDULING | Age: 64
End: 2024-11-04

## 2024-11-04 DIAGNOSIS — I10 ESSENTIAL (PRIMARY) HYPERTENSION: ICD-10-CM

## 2024-11-04 DIAGNOSIS — E78.5 HYPERLIPIDEMIA, UNSPECIFIED: Primary | ICD-10-CM

## 2024-11-18 ENCOUNTER — HOSPITAL ENCOUNTER (OUTPATIENT)
Dept: RADIOLOGY | Age: 64
Discharge: HOME | End: 2024-11-18
Attending: FAMILY MEDICINE
Payer: MEDICARE

## 2024-11-18 DIAGNOSIS — E78.5 HYPERLIPIDEMIA, UNSPECIFIED: ICD-10-CM

## 2024-11-18 DIAGNOSIS — I10 ESSENTIAL (PRIMARY) HYPERTENSION: ICD-10-CM

## 2024-11-18 PROCEDURE — 75571 CT HRT W/O DYE W/CA TEST: CPT

## 2025-01-08 ENCOUNTER — OFFICE VISIT (OUTPATIENT)
Dept: NEUROLOGY | Facility: CLINIC | Age: 65
End: 2025-01-08
Attending: NURSE PRACTITIONER
Payer: MEDICARE

## 2025-01-08 VITALS
SYSTOLIC BLOOD PRESSURE: 136 MMHG | RESPIRATION RATE: 16 BRPM | HEIGHT: 67 IN | HEART RATE: 68 BPM | WEIGHT: 182 LBS | OXYGEN SATURATION: 98 % | DIASTOLIC BLOOD PRESSURE: 80 MMHG | BODY MASS INDEX: 28.56 KG/M2

## 2025-01-08 DIAGNOSIS — G44.86 CERVICOGENIC HEADACHE: ICD-10-CM

## 2025-01-08 DIAGNOSIS — M54.2 CERVICALGIA: ICD-10-CM

## 2025-01-08 DIAGNOSIS — G43.719 CHRONIC MIGRAINE WITHOUT AURA, INTRACTABLE, WITHOUT STATUS MIGRAINOSUS: Primary | ICD-10-CM

## 2025-01-08 PROCEDURE — G8752 SYS BP LESS 140: HCPCS | Performed by: PSYCHIATRY & NEUROLOGY

## 2025-01-08 PROCEDURE — 64615 CHEMODENERV MUSC MIGRAINE: CPT | Performed by: PSYCHIATRY & NEUROLOGY

## 2025-01-08 PROCEDURE — G8754 DIAS BP LESS 90: HCPCS | Performed by: PSYCHIATRY & NEUROLOGY

## 2025-01-08 PROCEDURE — 99214 OFFICE O/P EST MOD 30 MIN: CPT | Mod: 25 | Performed by: PSYCHIATRY & NEUROLOGY

## 2025-01-08 RX ORDER — RIMEGEPANT SULFATE 75 MG/75MG
75 TABLET, ORALLY DISINTEGRATING ORAL AS NEEDED
Qty: 16 TABLET | Refills: 11 | Status: SHIPPED | OUTPATIENT
Start: 2025-01-08

## 2025-01-08 RX ORDER — NARATRIPTAN 2.5 MG/1
2.5 TABLET ORAL ONCE AS NEEDED
Qty: 12 TABLET | Refills: 11 | Status: SHIPPED | OUTPATIENT
Start: 2025-01-08 | End: 2026-01-08

## 2025-01-08 RX ORDER — METFORMIN HYDROCHLORIDE 500 MG/1
500 TABLET, EXTENDED RELEASE ORAL 2 TIMES DAILY
COMMUNITY
Start: 2024-10-22

## 2025-01-08 NOTE — PROCEDURES
Procedures  Procedure Note for Botox Injections for Headache:    Indication for procedure:    Diagnosis Plan   1. Chronic migraine without aura, intractable, without status migrainosus  [facility] Botulinum Toxin Injection Appointment Request    onabotulinumtoxinA (BOTOX) 200 unit injection 200 Units          I explained the risks and benefits and obtained consent from Olu.  Onabotulinumtoxin A 200 U were diluted in 4 mL of saline.    Lot number and expiration date documented in informed consent and MAR.  I performed a time-out, including 2 unique patient identifiers with Olu.  Using a 30 gauge 1/2 inch needle, I injected 0.1mL = 5 U into each site according to the Chronic Migraine Protocol (PREEMPT Studies).   The following table reports the number of sites injected in each muscle.       Muscle Midline Right Left   Procerus NONE          NONE NONE    Frontalis  1 behind hairline 2 behind hairline 2 behind hairline   Temporalis   4 (5+5+5+10) 4 (5+5+5+10)    Occipitalis   3 (5+10+10) 3 (5+10+10)    Cervical Paraspinal   2 2   Trapezius   3 (10+10+5)  3 (10+10+5)    Other:            195 Units were injected and 5 Units were wasted.    6/1/2023: Protocol modified based on prior Botox experience.    1/4/2024: Patient with persistent left side pain. Units added to temporalis and occipitalis on the left.  4/2/2024: Increased dose to 195 U by adding extra in the temporalis and occipitalis.   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     Olu tolerated the procedure well, without complications. He was instructed that he could experience increased pain in the next 2-3 days, which should be treated with ice and anti-inflammatory medications.      We appreciate the opportunity to participate in the care of Olu Collins.     Please, do not hesitate to call me at [phone] if you have  any questions or concerns.        Arleen Melo MD  [facility]  [phone]

## 2025-01-08 NOTE — PROGRESS NOTES
Olu LOZA Dennis presents today for botox treatment.    HPI:  Chronic migraine condition has been stable.    Metformin started and may be helping with his migraine headaches.    Per headache log he had headaches 17 days in October, 7 days in November, and 10 days in December.    Exam:  Vitals:    01/08/25 1423   BP: 136/80   Pulse: 68   Resp: 16   SpO2: 98%     Alert and oriented. Fluent with normal language and speech. Face symmetric.      Current Outpatient Medications:     acetaminophen-codeine (TYLENOL-CODEINE #3) 300-30 mg per tablet, take 1 tablet by oral route  every 6 hours as needed, Disp: , Rfl:     azelastine (ASTELIN) 137 mcg (0.1 %) nasal spray, spray 2 spray by intranasal route 2 times every day in each nostril, Disp: , Rfl:     budesonide (RHINOCORT AQUA) 32 mcg/actuation nasal spray, spray 1 spray by intranasal route  every day in each nostril, Disp: , Rfl:     calcipotriene 0.005 % ointment, Apply topically as needed., Disp: , Rfl:     cetirizine (ZyrTEC) 10 mg tablet, Take 10 mg by mouth daily., Disp: , Rfl:     clobetasol (OLUX) 0.05 % topical foam, apply by topical route 2 times every day to the affected area(s) in the morning and evening, Disp: , Rfl:     COQ10, UBIQUINOL, ORAL, Take by mouth daily., Disp: , Rfl:     cyanocobalamin (vitamin B-12) 1,000 mcg tablet, Daily, Disp: , Rfl:     cyclobenzaprine (FLEXERIL) 5 mg tablet, 1 TAB BY MOUTH UP TO BID PRN (Patient taking differently: Take 5 mg by mouth 2 (two) times a day as needed for muscle spasms. 1 TAB BY MOUTH UP TO BID PRN), Disp: 108 tablet, Rfl: 0    diclofenac sodium (VOLTAREN) 1 % topical gel, Apply topically 3 (three) times a day as needed for pain., Disp: , Rfl:     erenumab-aooe 140 mg/mL auto-injector, Inject 140 mg under the skin every 30 (thirty) days Indications: migraine prevention., Disp: 3 mL, Rfl: 3    escitalopram (LEXAPRO) 10 mg tablet, Take 10 mg by mouth daily., Disp: , Rfl:     famotidine (PEPCID) 40 mg tablet, Take 40  mg by mouth as needed for heartburn., Disp: , Rfl:     finasteride (PROSCAR) 5 mg tablet, Take 5 mg by mouth daily., Disp: , Rfl:     hydrochlorothiazide (HYDRODIURIL) 25 mg tablet, Take 25 mg by mouth daily., Disp: , Rfl:     HYDROcodone-acetaminophen (NORCO) 5-325 mg per tablet, TAKE 1 TABLET BY MOUTH EVERY 4-6 HRS AS NEEDED FOR PAIN, Disp: , Rfl: 0    ibuprofen (MOTRIN) 600 mg tablet, TAKE 1 TABLET BY MOUTH EVERY 6 HRS AS NEEDED, Disp: , Rfl: 0    losartan (COZAAR) 100 mg tablet, Take 100 mg by mouth daily. take 1 tablet by oral route  every day , Disp: , Rfl:     MAGNESIUM OXIDE ORAL, Take 250 mg by mouth daily., Disp: , Rfl:     metFORMIN XR (GLUCOPHAGE-XR) 500 mg 24 hr tablet, Take 500 mg by mouth 2 (two) times a day., Disp: , Rfl:     naratriptan (AMERGE) 2.5 mg tablet, Take 1 tablet (2.5 mg total) by mouth once as needed for migraine. May repeat in 4 hours if unresolved. Do not exceed 5 mg in 24 hours., Disp: 12 tablet, Rfl: 5    potassium chloride (KLOR-CON) 20 mEq CR tablet, Take 20 mEq by mouth daily., Disp: , Rfl:     RABEprazole (ACIPHEX) 20 mg EC tablet, Take 20 mg by mouth daily., Disp: , Rfl:     ranitidine (ZANTAC) 150 mg capsule, take 1 capsule (150MG)  by oral route prn, Disp: , Rfl:     rimegepant (NURTEC ODT) 75 mg tablet,disintegrating, Take 1 tablet (75 mg total) by mouth as needed (migraine)., Disp: 16 tablet, Rfl: 5    risankizumab-rzaa (SKYRIZI) 150 mg/mL pen injector, Inject under the skin., Disp: , Rfl:     rosuvastatin (CRESTOR) 20 mg tablet, , Disp: , Rfl:     SILODOSIN ORAL, Take by mouth daily with breakfast. DOSAGE UNKNOWN, Disp: , Rfl:     verapamiL 240 mg 24 hr capsule, Take 1 capsule (240 mg total) by mouth daily., Disp: 30 capsule, Rfl: 1    zolpidem (AMBIEN) 10 mg tablet, Take 10 mg by mouth., Disp: , Rfl:     azithromycin (ZITHROMAX) 250 mg tablet, TAKE 2 TABLETS BY MOUTH TODAY, THEN TAKE 1 TABLET DAILY FOR 4 DAYS, Disp: , Rfl:     dicyclomine (BENTYL) 20 mg tablet, Take 10-20  mg by mouth as needed., Disp: , Rfl:     hyoscyamine (LEVSIN) 0.125 mg SL tablet, prn, Disp: , Rfl:     pravastatin (PRAVACHOL) 40 mg tablet, take 1 tablet (40MG)  by oral route  every day, Disp: , Rfl:     tamsulosin (FLOMAX) 0.4 mg capsule, take 1 capsule (0.4MG)  by oral route  BID, Disp: , Rfl:     TUMERIC-GING-OLIVE-OREG-CAPRYL ORAL, Take 2,000 mg by mouth once., Disp: , Rfl:     Assessment and plan:  Chronic migraine.  Recommend continuing with the current treatment plan.   Refills of other medications sent today.    Follow-up for next botox every 12 weeks.        Arleen Melo MD  [facility]  [phone]    I spent 30 minutes on this date of service performing the following activities: obtaining history, performing examination, entering orders, documenting, preparing for visit, and providing counseling and education.  This was in addition to the time dedicated to the procedure.

## 2025-04-02 ENCOUNTER — OFFICE VISIT (OUTPATIENT)
Dept: NEUROLOGY | Facility: CLINIC | Age: 65
End: 2025-04-02
Attending: NURSE PRACTITIONER
Payer: MEDICARE

## 2025-04-02 VITALS
BODY MASS INDEX: 27.47 KG/M2 | OXYGEN SATURATION: 98 % | HEIGHT: 67 IN | WEIGHT: 175 LBS | SYSTOLIC BLOOD PRESSURE: 140 MMHG | HEART RATE: 62 BPM | DIASTOLIC BLOOD PRESSURE: 80 MMHG | RESPIRATION RATE: 12 BRPM

## 2025-04-02 DIAGNOSIS — G44.321 INTRACTABLE CHRONIC POST-TRAUMATIC HEADACHE: ICD-10-CM

## 2025-04-02 DIAGNOSIS — G44.86 CERVICOGENIC HEADACHE: ICD-10-CM

## 2025-04-02 DIAGNOSIS — M54.2 CERVICALGIA: ICD-10-CM

## 2025-04-02 DIAGNOSIS — G43.719 CHRONIC MIGRAINE WITHOUT AURA, INTRACTABLE, WITHOUT STATUS MIGRAINOSUS: Primary | ICD-10-CM

## 2025-04-02 PROCEDURE — 64615 CHEMODENERV MUSC MIGRAINE: CPT | Performed by: PSYCHIATRY & NEUROLOGY

## 2025-04-02 PROCEDURE — 99215 OFFICE O/P EST HI 40 MIN: CPT | Mod: 25 | Performed by: PSYCHIATRY & NEUROLOGY

## 2025-04-02 PROCEDURE — G8753 SYS BP > OR = 140: HCPCS | Performed by: PSYCHIATRY & NEUROLOGY

## 2025-04-02 PROCEDURE — G8754 DIAS BP LESS 90: HCPCS | Performed by: PSYCHIATRY & NEUROLOGY

## 2025-04-02 NOTE — PROGRESS NOTES
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 Q10.     Options for oral preventive medications include: nortriptyline, beta blockers, zonisamide, atogepant, rimegepant, valproate, gabapentin, pregabalin, duloxetine, candesartan, Namenda, venlafaxine, and levetiracetam.     Procedures that can be used to prevent headaches include: nerve blocks and trigger point injections. We recommend to obtain prior authorization from insurance when considering these.    Several non-invasive neuromodulation devices (gammaCore, Cefaly and Nerivio) are available to treat headaches preventively and/or acutely. These are typically not covered by insurance, but the companies have a trial period and will refund the cost of the device if ineffective and returned within the trial period.     Follow-up in the [facility] every 12 weeks for the next Botox treatment.      We appreciate the opportunity to participate in the care of Olu.     Please, do not hesitate to call our office at [phone] if you have any questions or concerns.        Arleen Melo MD  [facility]  [phone]    I spent  40 minutes on this date of service performing the following activities: obtaining history, performing examination, entering orders,  documenting, preparing for visit, and providing counseling and education. This was in addition to the time dedicated to the procedure.

## 2025-04-02 NOTE — LETTER
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      3. Preventive Treatment (when headaches occur more than 1 day/week or interfere with functioning):     Continue with Botox 195 U (modified protocol) every 12 weeks.     Continue with Aimovig 140 mg SC monthly.      Recommend discussing with PCP or cardiologist whether or not Verapamil is needed for HTN - this was started for migraine, but may not be needed at this time.     Future consideration previously discussed: switch Aimovig to IV Vyepti.     Nutraceutical options include: riboflavin, magnesium, melatonin, feverfew, and coenzyme Q10.     Options for oral preventive medications include: nortriptyline, beta blockers, zonisamide, atogepant, rimegepant, valproate, gabapentin, pregabalin, duloxetine, candesartan, Namenda, venlafaxine, and levetiracetam.     Procedures that can be used to prevent headaches include: nerve blocks and trigger point injections. We recommend to obtain prior authorization from insurance when considering these.    Several non-invasive neuromodulation devices (gammaCore, Cefaly and Nerivio) are available to treat headaches preventively and/or acutely. These are  typically not covered by insurance, but the companies have a trial period and will refund the cost of the device if ineffective and returned within the trial period.     Follow-up in the Headache Clinic every 12 weeks for the next Botox treatment.      We appreciate the opportunity to participate in the care of Olu.     Please, do not hesitate to call our office at [phone] if you have any questions or concerns.        Arleen Melo MD  [facility]  [phone]    I spent  40 minutes on this date of service performing the following activities: obtaining history, performing examination, entering orders, documenting, preparing for visit, and providing counseling and education. This was in addition to the time dedicated to the procedure.       Arleen Corral MD  4/2/2025  3:39 PM  Sign when Signing Visit  Procedures  Procedure Note for Botox Injections for Headache:    Indication for procedure:    Diagnosis Plan   1. Chronic migraine without aura, intractable, without status migrainosus  [facility] Botulinum Toxin Injection Appointment Request    onabotulinumtoxinA (BOTOX) 200 unit injection 200 Units          I explained the risks and benefits and obtained consent from Olu.  Onabotulinumtoxin A 200 U were diluted in 4 mL of saline.    Lot number and expiration date documented in informed consent and MAR.  I performed a time-out, including 2 unique patient identifiers with Olu.  Using a 30 gauge 1/2 inch needle, I injected 0.1mL = 5 U into each site according to the Chronic Migraine Protocol (PREEMPT Studies).   The following table reports the number of sit    Muscle Midline Right Left   Procerus NONE          NONE NONE    Frontalis  1 behind hairline 2 behind hairline 2 behind hairline   Temporalis   4 (5+5+5+10) 4 (5+5+5+10)    Occipitalis   3 (5+10+10) 3 (5+10+10)    Cervical Paraspinal   2 2   Trapezius   3 (10+10+5)  3 (10+10+5)    Other:            195 Units were injected  and 5 Units were wasted.    6/1/2023: Protocol modified based on prior Botox experience.    1/4/2024: Patient with persistent left side pain. Units added to temporalis and occipitalis on the left.  4/2/2024: Increased dose to 195 U by adding extra in the temporalis and occipitalis.   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     Olu tolerated the procedure well, without complications. He was instructed that he could experience increased pain in the next 2-3 days, which should be treated with ice and anti-inflammatory medications.      We appreciate the opportunity to participate in the care of Olu Collins.     Please, do not hesitate to call me at [phone] if you have any questions or concerns.        Arleen Melo MD  [facility]  [phone]

## 2025-04-02 NOTE — PATIENT INSTRUCTIONS
Treatment plan:      1. Healthy Habits: The following recommendations can greatly reduce the number and severity of headaches.  Maintain regular sleep hours and get sufficient sleep (8-9 hours)  Do not skip meals, especially breakfast  Drink at least 64 oz or 8 cups of water daily - enough to urinate 5-6 times a day  Get at least 30 minutes of daily aerobic exercise (enough to increase your heart rate and sweat)    Keep track of headaches and use of acute medication (prescription or over-the-counter) in a calendar or notebook and bring that to your clinic visits.  May use the free robb - [employer] Migraine Tracker    2. Acute Treatment:     Continue with naratriptan or sumatriptan depending on migraine onset. Discussed effect on blood pressure.    - Naratriptan 2.5 mg at onset of migraine, may repeat once after FOUR hours if needed. Max of 2 per 24 hours. Limit to 2-3 days per week to avoid rebound headache  - Sumatriptan as needed at onset of moderate to severe headache. May repeat once 2 hours later. Maximum 200 mg in 24 hr. Limit to 2 days/week.     May use Nurtec ODT 75 mg as needed at onset of moderate to severe headache. Maximum 1 tablet in 24 hours.     May continue with Flexeril 5 or 10 mg as needed at bedtime for neck pain and cervicogenic headaches.     Future consideration: Other triptans, Ubrelvy.     Lasmiditan, Sprix nasal spray, Zavzpret nasal spray, or Dihydroergotamine nasal spray can be tried for rescue treatment if needed in the future.     We may add an antinausea medication if needed for nausea or as co-adjuvant if monotherapy is not sufficient.      3. Preventive Treatment (when headaches occur more than 1 day/week or interfere with functioning):     Continue with Botox 195 U (modified protocol) every 12 weeks.     Continue with Aimovig 140 mg SC monthly.      Recommend discussing with PCP or cardiologist whether or not Verapamil is needed for HTN - this was started for migraine, but may not be  needed at this time.     Future consideration previously discussed: switch Aimovig to IV Vyepti.     Nutraceutical options include: riboflavin, magnesium, melatonin, feverfew, and coenzyme Q10.     Options for oral preventive medications include: nortriptyline, beta blockers, zonisamide, atogepant, rimegepant, valproate, gabapentin, pregabalin, duloxetine, candesartan, Namenda, venlafaxine, and levetiracetam.     Procedures that can be used to prevent headaches include: nerve blocks and trigger point injections. We recommend to obtain prior authorization from insurance when considering these.    Several non-invasive neuromodulation devices (gammaCore, Cefaly and Nerivio) are available to treat headaches preventively and/or acutely. These are typically not covered by insurance, but the companies have a trial period and will refund the cost of the device if ineffective and returned within the trial period.     Follow-up in the Headache Clinic in 12 weeks for the next Botox treatment.

## 2025-05-27 ENCOUNTER — TELEPHONE (OUTPATIENT)
Dept: SCHEDULING | Facility: CLINIC | Age: 65
End: 2025-05-27
Payer: MEDICARE

## 2025-06-04 DIAGNOSIS — G43.719 CHRONIC MIGRAINE WITHOUT AURA, INTRACTABLE, WITHOUT STATUS MIGRAINOSUS: ICD-10-CM

## 2025-06-04 RX ORDER — ERENUMAB-AOOE 140 MG/ML
140 INJECTION, SOLUTION SUBCUTANEOUS
Qty: 3 ML | Refills: 3 | Status: SHIPPED | OUTPATIENT
Start: 2025-06-04

## 2025-06-04 NOTE — TELEPHONE ENCOUNTER
Medicine Refill Request    Last Office Visit: 4/2/2025   Last Telemedicine Visit: 4/17/2023 Mauricio Love MD     Next Office Visit: Visit date not found  Next Telemedicine Visit: Visit date not found      Continue with Aimovig 140 mg SC monthly.

## 2025-07-30 ENCOUNTER — OFFICE VISIT (OUTPATIENT)
Dept: NEUROLOGY | Facility: CLINIC | Age: 65
End: 2025-07-30
Attending: NURSE PRACTITIONER
Payer: MEDICARE

## 2025-07-30 VITALS
RESPIRATION RATE: 12 BRPM | OXYGEN SATURATION: 94 % | WEIGHT: 175 LBS | HEART RATE: 96 BPM | DIASTOLIC BLOOD PRESSURE: 80 MMHG | SYSTOLIC BLOOD PRESSURE: 130 MMHG | BODY MASS INDEX: 27.47 KG/M2 | HEIGHT: 67 IN

## 2025-07-30 DIAGNOSIS — G44.86 CERVICOGENIC HEADACHE: ICD-10-CM

## 2025-07-30 DIAGNOSIS — M54.2 CERVICALGIA: ICD-10-CM

## 2025-07-30 DIAGNOSIS — G43.719 CHRONIC MIGRAINE WITHOUT AURA, INTRACTABLE, WITHOUT STATUS MIGRAINOSUS: Primary | ICD-10-CM

## 2025-07-30 DIAGNOSIS — G44.321 INTRACTABLE CHRONIC POST-TRAUMATIC HEADACHE: ICD-10-CM

## 2025-07-30 NOTE — PATIENT INSTRUCTIONS
Treatment plan:      1. Healthy Habits: The following recommendations can greatly reduce the number and severity of headaches.  Maintain regular sleep hours and get sufficient sleep (8-9 hours)  Do not skip meals, especially breakfast  Drink at least 64 oz or 8 cups of water daily - enough to urinate 5-6 times a day  Get at least 30 minutes of daily aerobic exercise (enough to increase your heart rate and sweat)    Keep track of headaches and use of acute medication (prescription or over-the-counter) in a calendar or notebook and bring that to your clinic visits.  May use the free robb - [employer] Migraine Tracker    2. Acute Treatment:     Continue with naratriptan or sumatriptan depending on migraine onset. Discussed effect on blood pressure.    - Naratriptan 2.5 mg at onset of migraine, may repeat once after FOUR hours if needed. Max of 2 per 24 hours. Limit to 2-3 days per week to avoid rebound headache  - Sumatriptan as needed at onset of moderate to severe headache. May repeat once 2 hours later. Maximum 200 mg in 24 hr. Limit to 2 days/week.     May use Nurtec ODT 75 mg as needed at onset of moderate to severe headache. Maximum 1 tablet in 24 hours.     May continue with Flexeril 5 or 10 mg as needed at bedtime for neck pain and cervicogenic headaches.     Future consideration: Other triptans, Ubrelvy.     Lasmiditan, Sprix nasal spray, Zavzpret nasal spray, or Dihydroergotamine nasal spray can be tried for rescue treatment if needed in the future.     We may add an antinausea medication if needed for nausea or as co-adjuvant if monotherapy is not sufficient.      3. Preventive Treatment (when headaches occur more than 1 day/week or interfere with functioning):     Continue with Botox 195 U (modified protocol) every 12 weeks.     Continue with Aimovig 140 mg SC monthly.      Recommend discussing with PCP or cardiologist whether or not Verapamil is needed for HTN - this was started for migraine, but may not be  needed at this time.     Future consideration previously discussed: switch Aimovig to IV Vyepti.     Nutraceutical options include: riboflavin, magnesium, melatonin, feverfew, and coenzyme Q10.     Options for oral preventive medications include: nortriptyline, beta blockers, zonisamide, atogepant, rimegepant, valproate, gabapentin, pregabalin, duloxetine, candesartan, Namenda, venlafaxine, and levetiracetam.     Procedures that can be used to prevent headaches include: nerve blocks and trigger point injections. We recommend to obtain prior authorization from insurance when considering these.    Several non-invasive neuromodulation devices (gammaCore, Cefaly and Nerivio) are available to treat headaches preventively and/or acutely. These are typically not covered by insurance, but the companies have a trial period and will refund the cost of the device if ineffective and returned within the trial period.     Follow-up in the Headache Clinic every 12 weeks for the next Botox treatment.

## 2025-07-30 NOTE — PROCEDURES
Procedures  Procedure Note for Botox Injections for Headache:    Indication for procedure:    Diagnosis Plan   1. Chronic migraine without aura, intractable, without status migrainosus  [facility] Botulinum Toxin Injection Appointment Request    botulinum toxin Type A (BOTOX) 200 unit injection 200 Units      2. Cervicogenic headache        3. Cervicalgia        4. Intractable chronic post-traumatic headache            I explained the risks and benefits and obtained consent from Olu.  Onabotulinumtoxin A 200 U were diluted in 4 mL of saline.    Lot number and expiration date documented in informed consent and MAR.  I performed a time-out, including 2 unique patient identifiers with Olu.  Using a 30 gauge 1/2 inch needle, I injected 0.1mL = 5 U into each site according to the Chronic Migraine Protocol (PREEMPT Studies).   The following table reports the number of sites injected in each muscle.     Muscle Midline Right Left   Procerus NONE          NONE NONE    Frontalis  1 behind hairline 2 behind hairline 2 behind hairline   Temporalis   4 (5+5+5+10) 4 (5+5+5+10)    Occipitalis   3 (5+10+10) 3 (5+10+10)    Cervical Paraspinal   2 2   Trapezius   3 (10+10+5)  3 (10+10+5)    Other:            195 Units were injected and 5 Units were wasted.    6/1/2023: Protocol modified based on prior Botox experience.    1/4/2024: Patient with persistent left side pain. Units added to temporalis and occipitalis on the left.  4/2/2024: Increased dose to 195 U by adding extra in the temporalis and occipitalis.   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        Olu tolerated the procedure well, without complications. He was instructed that he could experience increased pain in the next 2-3 days, which should be treated with ice and anti-inflammatory medications.      We appreciate the opportunity  to participate in the care of Olu LOZA Dennis.     Please, do not hesitate to call me at [phone] if you have any questions or concerns.        Arleen Melo MD  [facility]  [phone]

## 2025-07-30 NOTE — PROGRESS NOTES
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 nasal spray can be tried for rescue treatment if needed in the future.     We may add an antinausea medication if needed for nausea or as co-adjuvant if monotherapy is not sufficient.      3. Preventive Treatment (when headaches occur more than 1 day/week or interfere with functioning):     Continue with Botox 195 U (modified protocol) every 12 weeks.     Continue with Aimovig 140 mg SC monthly.      Recommend discussing with PCP or cardiologist whether or not Verapamil is needed for HTN - this was started for migraine, but may not be needed at this time.     Future consideration previously discussed: switch Aimovig to IV Vyepti.     Nutraceutical options include: riboflavin, magnesium, melatonin, feverfew, and coenzyme Q10.     Options for oral preventive medications include: nortriptyline, beta blockers, zonisamide, atogepant, rimegepant, valproate, gabapentin, pregabalin, duloxetine, candesartan, Namenda, venlafaxine, and levetiracetam.     Procedures that can be used to prevent headaches include: nerve blocks and trigger point injections. We recommend to obtain prior authorization from insurance when considering these.    Several non-invasive neuromodulation devices (Jobzle,  Cefaly and Nerivio) are available to treat headaches preventively and/or acutely. These are typically not covered by insurance, but the companies have a trial period and will refund the cost of the device if ineffective and returned within the trial period.     Follow-up in the [facility] every 12 weeks for the next Botox treatment.      We appreciate the opportunity to participate in the care of [facility].     Please, do not hesitate to call our office at [phone] if you have any questions or concerns.      Arleen Melo MD  [facility]  [phone]    I spent 30 minutes on this date of service performing the following activities: obtaining history, performing examination, entering orders, documenting, preparing for visit, and providing counseling and education. This was in addition to the time dedicated to the procedure.

## 2025-07-30 NOTE — LETTER
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 2-3 days per week to avoid rebound headache  - Sumatriptan as needed at onset of moderate to severe headache. May repeat once 2 hours later. Maximum 200 mg in 24 hr. Limit to 2 days/week.     May use Nurtec ODT 75 mg as needed at onset of moderate to severe headache. Maximum 1 tablet in 24 hours.     May continue with Flexeril 5 or 10 mg as needed at bedtime for neck pain and cervicogenic headaches.     Future consideration: Other triptans, Ubrelvy.     Lasmiditan, Sprix nasal spray, Zavzpret nasal spray, or Dihydroergotamine nasal spray can be tried for rescue treatment if needed in the future.     We may add an antinausea medication if needed for nausea or as co-adjuvant if monotherapy is not sufficient.      3. Preventive Treatment (when headaches occur more than 1 day/week or interfere with functioning):     Continue with Botox 195 U (modified protocol) every 12 weeks.     Continue with Aimovig 140 mg SC monthly.      Recommend discussing with PCP or cardiologist whether or not Verapamil is needed for HTN - this was started for migraine, but may not be needed at this time.     Future consideration  previously discussed: switch Aimovig to IV Vyepti.     Nutraceutical options include: riboflavin, magnesium, melatonin, feverfew, and coenzyme Q10.     Options for oral preventive medications include: nortriptyline, beta blockers, zonisamide, atogepant, rimegepant, valproate, gabapentin, pregabalin, duloxetine, candesartan, Namenda, venlafaxine, and levetiracetam.     Procedures that can be used to prevent headaches include: nerve blocks and trigger point injections. We recommend to obtain prior authorization from insurance when considering these.    Several non-invasive neuromodulation devices (gammaCore, Cefaly and Nerivio) are available to treat headaches preventively and/or acutely. These are typically not covered by insurance, but the companies have a trial period and will refund the cost of the device if ineffective and returned within the trial period.     Follow-up in the [facility] every 12 weeks for the next Botox treatment.      We appreciate the opportunity to participate in the care of Olu.     Please, do not hesitate to call our office at [phone] if you have any questions or concerns.      Arleen Melo MD  [facility]  [phone]    I spent 30 minutes on this date of service performing the following activities: obtaining history, performing examination, entering orders, documenting, preparing for visit, and providing counseling and education. This was in addition to the time dedicated to the procedure.       Arleen Corral MD  7/30/2025  3:13 PM  Sign when Signing Visit  Procedures  Procedure Note for Botox Injections for Headache:    Indication for procedure:    Diagnosis Plan   1. Chronic migraine without aura, intractable, without status migrainosus  [facility] Botulinum Toxin Injection Appointment Request    botulinum toxin Type A (BOTOX) 200 unit injection 200 Units      2. Cervicogenic headache        3. Cervicalgia        4. Intractable chronic post-traumatic  headache            I explained the risks and benefits and obtained consent from Olu.  Onabotulinumtoxin A 200 U were diluted in 4 mL of saline.    Lot number and expiration date documented in informed consent and MAR.  I performed a time-out, including 2 unique patient identifiers with Olu.  Using a 30 gauge 1/2 inch needle, I injected 0.1mL = 5 U into each site according to the Chronic Migraine Protocol (PREEMPT Studies).   The following table reports the number of sites injected in each muscle.     Muscle Midline Right Left   Procerus NONE          NONE NONE    Frontalis  1 behind hairline 2 behind hairline 2 behind hairline   Temporalis   4 (5+5+5+10) 4 (5+5+5+10)    Occipitalis   3 (5+10+10) 3 (5+10+10)    Cervical Paraspinal   2 2   Trapezius   3 (10+10+5)  3 (10+10+5)    Other:            195 Units were injected and 5 Units were wasted.    6/1/2023: Protocol modified based on prior Botox experience.    1/4/2024: Patient with persistent left side pain. Units added to temporalis and occipitalis on the left.  4/2/2024: Increased dose to 195 U by adding extra in the temporalis and occipitalis.   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        Olu tolerated the procedure well, without complications. He was instructed that he could experience increased pain in the next 2-3 days, which should be treated with ice and anti-inflammatory medications.      We appreciate the opportunity to participate in the care of Olu Collins.     Please, do not hesitate to call me at [phone] if you have any questions or concerns.        Arleen Melo MD  [facility]  [phone]